# Patient Record
Sex: MALE | Race: WHITE | NOT HISPANIC OR LATINO | Employment: FULL TIME | URBAN - METROPOLITAN AREA
[De-identification: names, ages, dates, MRNs, and addresses within clinical notes are randomized per-mention and may not be internally consistent; named-entity substitution may affect disease eponyms.]

---

## 2017-01-08 ENCOUNTER — APPOINTMENT (OUTPATIENT)
Dept: LAB | Age: 57
End: 2017-01-08
Payer: COMMERCIAL

## 2017-01-08 DIAGNOSIS — Z12.5 ENCOUNTER FOR SCREENING FOR MALIGNANT NEOPLASM OF PROSTATE: ICD-10-CM

## 2017-01-08 DIAGNOSIS — Z00.00 ENCOUNTER FOR GENERAL ADULT MEDICAL EXAMINATION WITHOUT ABNORMAL FINDINGS: ICD-10-CM

## 2017-01-08 DIAGNOSIS — Z12.11 ENCOUNTER FOR SCREENING FOR MALIGNANT NEOPLASM OF COLON: ICD-10-CM

## 2017-01-08 DIAGNOSIS — E78.5 HYPERLIPIDEMIA: ICD-10-CM

## 2017-01-08 DIAGNOSIS — R73.02 IMPAIRED GLUCOSE TOLERANCE: ICD-10-CM

## 2017-01-08 LAB
ALBUMIN SERPL BCP-MCNC: 3.4 G/DL (ref 3.5–5)
ALP SERPL-CCNC: 79 U/L (ref 46–116)
ALT SERPL W P-5'-P-CCNC: 28 U/L (ref 12–78)
ANION GAP SERPL CALCULATED.3IONS-SCNC: 4 MMOL/L (ref 4–13)
AST SERPL W P-5'-P-CCNC: 17 U/L (ref 5–45)
BASOPHILS # BLD AUTO: 0.03 THOUSANDS/ΜL (ref 0–0.1)
BASOPHILS NFR BLD AUTO: 1 % (ref 0–1)
BILIRUB SERPL-MCNC: 0.58 MG/DL (ref 0.2–1)
BILIRUB UR QL STRIP: NEGATIVE
BUN SERPL-MCNC: 19 MG/DL (ref 5–25)
CALCIUM SERPL-MCNC: 8.7 MG/DL (ref 8.3–10.1)
CHLORIDE SERPL-SCNC: 106 MMOL/L (ref 100–108)
CHOLEST SERPL-MCNC: 196 MG/DL (ref 50–200)
CLARITY UR: CLEAR
CO2 SERPL-SCNC: 30 MMOL/L (ref 21–32)
COLOR UR: YELLOW
CREAT SERPL-MCNC: 1.07 MG/DL (ref 0.6–1.3)
EOSINOPHIL # BLD AUTO: 0.21 THOUSAND/ΜL (ref 0–0.61)
EOSINOPHIL NFR BLD AUTO: 4 % (ref 0–6)
ERYTHROCYTE [DISTWIDTH] IN BLOOD BY AUTOMATED COUNT: 12.1 % (ref 11.6–15.1)
GFR SERPL CREATININE-BSD FRML MDRD: >60 ML/MIN/1.73SQ M
GLUCOSE SERPL-MCNC: 99 MG/DL (ref 65–140)
GLUCOSE UR STRIP-MCNC: NEGATIVE MG/DL
HCT VFR BLD AUTO: 43.8 % (ref 36.5–49.3)
HDLC SERPL-MCNC: 36 MG/DL (ref 40–60)
HGB BLD-MCNC: 14.6 G/DL (ref 12–17)
HGB UR QL STRIP.AUTO: NEGATIVE
KETONES UR STRIP-MCNC: NEGATIVE MG/DL
LDLC SERPL CALC-MCNC: 139 MG/DL (ref 0–100)
LEUKOCYTE ESTERASE UR QL STRIP: NEGATIVE
LYMPHOCYTES # BLD AUTO: 1.22 THOUSANDS/ΜL (ref 0.6–4.47)
LYMPHOCYTES NFR BLD AUTO: 22 % (ref 14–44)
MCH RBC QN AUTO: 29.6 PG (ref 26.8–34.3)
MCHC RBC AUTO-ENTMCNC: 33.3 G/DL (ref 31.4–37.4)
MCV RBC AUTO: 89 FL (ref 82–98)
MONOCYTES # BLD AUTO: 0.62 THOUSAND/ΜL (ref 0.17–1.22)
MONOCYTES NFR BLD AUTO: 11 % (ref 4–12)
NEUTROPHILS # BLD AUTO: 3.42 THOUSANDS/ΜL (ref 1.85–7.62)
NEUTS SEG NFR BLD AUTO: 62 % (ref 43–75)
NITRITE UR QL STRIP: NEGATIVE
NRBC BLD AUTO-RTO: 0 /100 WBCS
PH UR STRIP.AUTO: 7 [PH] (ref 4.5–8)
PLATELET # BLD AUTO: 270 THOUSANDS/UL (ref 149–390)
PMV BLD AUTO: 9.7 FL (ref 8.9–12.7)
POTASSIUM SERPL-SCNC: 4.5 MMOL/L (ref 3.5–5.3)
PROT SERPL-MCNC: 6.7 G/DL (ref 6.4–8.2)
PROT UR STRIP-MCNC: NEGATIVE MG/DL
PSA SERPL-MCNC: 0.9 NG/ML (ref 0–4)
RBC # BLD AUTO: 4.94 MILLION/UL (ref 3.88–5.62)
SODIUM SERPL-SCNC: 140 MMOL/L (ref 136–145)
SP GR UR STRIP.AUTO: 1.02 (ref 1–1.03)
TRIGL SERPL-MCNC: 107 MG/DL
UROBILINOGEN UR QL STRIP.AUTO: 0.2 E.U./DL
WBC # BLD AUTO: 5.51 THOUSAND/UL (ref 4.31–10.16)

## 2017-01-08 PROCEDURE — 36415 COLL VENOUS BLD VENIPUNCTURE: CPT

## 2017-01-08 PROCEDURE — G0103 PSA SCREENING: HCPCS

## 2017-01-08 PROCEDURE — 80061 LIPID PANEL: CPT

## 2017-01-08 PROCEDURE — 80053 COMPREHEN METABOLIC PANEL: CPT

## 2017-01-08 PROCEDURE — 81003 URINALYSIS AUTO W/O SCOPE: CPT

## 2017-01-08 PROCEDURE — 85025 COMPLETE CBC W/AUTO DIFF WBC: CPT

## 2017-12-04 DIAGNOSIS — Z12.5 ENCOUNTER FOR SCREENING FOR MALIGNANT NEOPLASM OF PROSTATE: ICD-10-CM

## 2017-12-04 DIAGNOSIS — Z12.11 ENCOUNTER FOR SCREENING FOR MALIGNANT NEOPLASM OF COLON: ICD-10-CM

## 2017-12-04 DIAGNOSIS — E78.5 HYPERLIPIDEMIA: ICD-10-CM

## 2017-12-04 DIAGNOSIS — R73.02 IMPAIRED GLUCOSE TOLERANCE: ICD-10-CM

## 2017-12-10 ENCOUNTER — TRANSCRIBE ORDERS (OUTPATIENT)
Dept: ADMINISTRATIVE | Age: 57
End: 2017-12-10

## 2017-12-10 ENCOUNTER — APPOINTMENT (OUTPATIENT)
Dept: LAB | Age: 57
End: 2017-12-10
Payer: COMMERCIAL

## 2017-12-10 DIAGNOSIS — Z12.11 ENCOUNTER FOR SCREENING FOR MALIGNANT NEOPLASM OF COLON: ICD-10-CM

## 2017-12-10 DIAGNOSIS — E78.5 HYPERLIPIDEMIA: ICD-10-CM

## 2017-12-10 DIAGNOSIS — Z12.5 ENCOUNTER FOR SCREENING FOR MALIGNANT NEOPLASM OF PROSTATE: ICD-10-CM

## 2017-12-10 DIAGNOSIS — R73.02 IMPAIRED GLUCOSE TOLERANCE: ICD-10-CM

## 2017-12-10 LAB
ALBUMIN SERPL BCP-MCNC: 3.3 G/DL (ref 3.5–5)
ALP SERPL-CCNC: 80 U/L (ref 46–116)
ALT SERPL W P-5'-P-CCNC: 31 U/L (ref 12–78)
ANION GAP SERPL CALCULATED.3IONS-SCNC: 5 MMOL/L (ref 4–13)
AST SERPL W P-5'-P-CCNC: 21 U/L (ref 5–45)
BASOPHILS # BLD AUTO: 0.03 THOUSANDS/ΜL (ref 0–0.1)
BASOPHILS NFR BLD AUTO: 1 % (ref 0–1)
BILIRUB SERPL-MCNC: 0.29 MG/DL (ref 0.2–1)
BILIRUB UR QL STRIP: NEGATIVE
BUN SERPL-MCNC: 19 MG/DL (ref 5–25)
CALCIUM SERPL-MCNC: 8.7 MG/DL (ref 8.3–10.1)
CHLORIDE SERPL-SCNC: 107 MMOL/L (ref 100–108)
CLARITY UR: CLEAR
CO2 SERPL-SCNC: 29 MMOL/L (ref 21–32)
COLOR UR: YELLOW
CREAT SERPL-MCNC: 1.08 MG/DL (ref 0.6–1.3)
EOSINOPHIL # BLD AUTO: 0.2 THOUSAND/ΜL (ref 0–0.61)
EOSINOPHIL NFR BLD AUTO: 4 % (ref 0–6)
ERYTHROCYTE [DISTWIDTH] IN BLOOD BY AUTOMATED COUNT: 12.4 % (ref 11.6–15.1)
GFR SERPL CREATININE-BSD FRML MDRD: 76 ML/MIN/1.73SQ M
GLUCOSE P FAST SERPL-MCNC: 98 MG/DL (ref 65–99)
GLUCOSE UR STRIP-MCNC: NEGATIVE MG/DL
HCT VFR BLD AUTO: 41.5 % (ref 36.5–49.3)
HGB BLD-MCNC: 13.8 G/DL (ref 12–17)
HGB UR QL STRIP.AUTO: NEGATIVE
KETONES UR STRIP-MCNC: NEGATIVE MG/DL
LEUKOCYTE ESTERASE UR QL STRIP: NEGATIVE
LYMPHOCYTES # BLD AUTO: 1.41 THOUSANDS/ΜL (ref 0.6–4.47)
LYMPHOCYTES NFR BLD AUTO: 28 % (ref 14–44)
MCH RBC QN AUTO: 29.3 PG (ref 26.8–34.3)
MCHC RBC AUTO-ENTMCNC: 33.3 G/DL (ref 31.4–37.4)
MCV RBC AUTO: 88 FL (ref 82–98)
MONOCYTES # BLD AUTO: 0.61 THOUSAND/ΜL (ref 0.17–1.22)
MONOCYTES NFR BLD AUTO: 12 % (ref 4–12)
NEUTROPHILS # BLD AUTO: 2.79 THOUSANDS/ΜL (ref 1.85–7.62)
NEUTS SEG NFR BLD AUTO: 55 % (ref 43–75)
NITRITE UR QL STRIP: NEGATIVE
NRBC BLD AUTO-RTO: 0 /100 WBCS
PH UR STRIP.AUTO: 6 [PH] (ref 4.5–8)
PLATELET # BLD AUTO: 296 THOUSANDS/UL (ref 149–390)
PMV BLD AUTO: 9.6 FL (ref 8.9–12.7)
POTASSIUM SERPL-SCNC: 4.5 MMOL/L (ref 3.5–5.3)
PROT SERPL-MCNC: 6.8 G/DL (ref 6.4–8.2)
PROT UR STRIP-MCNC: NEGATIVE MG/DL
PSA SERPL-MCNC: 0.7 NG/ML (ref 0–4)
RBC # BLD AUTO: 4.71 MILLION/UL (ref 3.88–5.62)
SODIUM SERPL-SCNC: 141 MMOL/L (ref 136–145)
SP GR UR STRIP.AUTO: 1.02 (ref 1–1.03)
UROBILINOGEN UR QL STRIP.AUTO: 0.2 E.U./DL
WBC # BLD AUTO: 5.05 THOUSAND/UL (ref 4.31–10.16)

## 2017-12-10 PROCEDURE — 85025 COMPLETE CBC W/AUTO DIFF WBC: CPT

## 2017-12-10 PROCEDURE — G0103 PSA SCREENING: HCPCS

## 2017-12-10 PROCEDURE — 80053 COMPREHEN METABOLIC PANEL: CPT

## 2017-12-10 PROCEDURE — 36415 COLL VENOUS BLD VENIPUNCTURE: CPT

## 2017-12-10 PROCEDURE — 81003 URINALYSIS AUTO W/O SCOPE: CPT

## 2017-12-11 ENCOUNTER — ALLSCRIPTS OFFICE VISIT (OUTPATIENT)
Dept: OTHER | Facility: OTHER | Age: 57
End: 2017-12-11

## 2018-01-10 NOTE — PROGRESS NOTES
Assessment    1  Hyperlipemia (272 4) (E78 5)   2  Glucose intolerance (impaired glucose tolerance) (790 22) (R73 02)    Plan  Colon cancer screening, Encounter for prostate cancer screening    · (1) PSA (SCREEN) (Dx V76 44 Screen for Prostate Cancer); Status:Active; Requested  for:10Geq3330;   Colon cancer screening, Health Maintenance, Glucose intolerance (impaired glucose  tolerance), Hyperlipemia    · (1) OCCULT BLOOD, FECAL IMMUNOCHEMICAL TEST; Status:Active; Requested  for:11Xvw0740;   Encounter for prostate cancer screening    · (1) URINALYSIS (will reflex a microscopy if leukocytes, occult blood, protein or nitrites  are not within normal limits); Status:Active; Requested for:01Ync6091; Health Maintenance    · EKG/ECG- POC; Status:Active - Perform Order; Requested for:49Tts1833; Health Maintenance, Glucose intolerance (impaired glucose tolerance), Hyperlipemia    · (1) CBC/PLT/DIFF; Status:Active; Requested for:02Oev9141;    · (1) COMPREHENSIVE METABOLIC PANEL; Status:Active; Requested for:46Ikp6487;    · (1) LIPID PANEL FASTING W DIRECT LDL REFLEX; Status:Active; Requested  for:65Dus0369;     She'll obtain routine laboratory blood work to evaluate his PSA lipid profile CBC comprehensive metabolic profile and routine fit test     Discussion/Summary  Patient's examination today appears to be in good order no new findings on his exam  Pending at this time as his blood work we will review with the patient when it becomes available  I like to see him in 1 year for his next visit  Healthy lifestyle was reviewed with the patient including adequate exercise healthy diet and an adequate amount of sleep at night  He is due for his next colonoscopy in 2018  Chief Complaint  patient is here for a yearly physical       History of Present Illness  HM, Adult Male: The patient is being seen for a health maintenance evaluation  The last health maintenance visit was 1 year(s) ago     Social History: Household members include spouse  He is   Work status: working full time  The patient has never smoked cigarettes  He reports rare alcohol use  The patient has no concerns about alcohol abuse  He has never used illicit drugs  General Health: The patient's health since the last visit is described as good  He has regular dental visits  He denies vision problems  He denies hearing loss  Immunizations status: up to date  Lifestyle:  He consumes a diverse and healthy diet  He does not have any weight concerns  He exercises regularly  He does not use tobacco  He denies alcohol use  He denies drug use  Screening: cancer screening reviewed and current  metabolic screening reviewed and current  risk screening reviewed and current  HPI: This 29-year-old gentleman presents today for an annual health maintenance examination  He indicates no new problems or issues since last year's examination  He has a history of mild glucose intolerance and hyperlipidemia was some mild enlargement in his prostate gland which is been asymptomatic  Review of Systems    Constitutional: No fever or chills, feels well, no tiredness, no recent weight gain or weight loss  Eyes: No complaints of eye pain, no red eyes, no discharge from eyes, no itchy eyes  ENT: no complaints of earache, no hearing loss, no nosebleeds, no nasal discharge, no sore throat, no hoarseness  Cardiovascular: No complaints of slow heart rate, no fast heart rate, no chest pain, no palpitations, no leg claudication, no lower extremity  Respiratory: No complaints of shortness of breath, no wheezing, no cough, no SOB on exertion, no orthopnea or PND  Gastrointestinal: No complaints of abdominal pain, no constipation, no nausea or vomiting, no diarrhea or bloody stools  Genitourinary: No complaints of dysuria, no incontinence, no hesitancy, no nocturia, no genital lesion, no testicular pain     Musculoskeletal: No complaints of arthralgia, no myalgias, no joint swelling or stiffness, no limb pain or swelling  Integumentary: No complaints of skin rash or skin lesions, no itching, no skin wound, no dry skin  Neurological: No compliants of headache, no confusion, no convulsions, no numbness or tingling, no dizziness or fainting, no limb weakness, no difficulty walking  Psychiatric: Is not suicidal, no sleep disturbances, no anxiety or depression, no change in personality, no emotional problems  Endocrine: No complaints of proptosis, no hot flashes, no muscle weakness, no erectile dysfunction, no deepening of the voice, no feelings of weakness  Hematologic/Lymphatic: No complaints of swollen glands, no swollen glands in the neck, does not bleed easily, no easy bruising  Active Problems    1  Facial swelling (784 2) (R22 0)    Family History  Family History    · Family history of Heart disease (429 9) (I51 9)    Social History    · Never smoker    Current Meds   1  No Reported Medications Recorded    Allergies    1  Penicillins    Vitals   Recorded: 69TBA2549 11:13AM   Temperature 97 4 F   Heart Rate 75   Respiration 14   Systolic 524   Diastolic 70   Height 5 ft 6 in   Weight 192 lb    BMI Calculated 30 99   BSA Calculated 1 97   O2 Saturation 98     Physical Exam    Constitutional   General appearance: No acute distress, well appearing and well nourished  Head and Face   Head and face: Normal     Eyes   Conjunctiva and lids: No erythema, swelling or discharge  Pupils and irises: Equal, round, reactive to light  Ophthalmoscopic examination: Normal fundi and optic discs  Ears, Nose, Mouth, and Throat   External inspection of ears and nose: Normal     Otoscopic examination: Tympanic membranes translucent with normal light reflex  Canals patent without erythema  Nasal mucosa, septum, and turbinates: Normal without edema or erythema  Lips, teeth, and gums: Normal, good dentition      Oropharynx: Normal with no erythema, edema, exudate or lesions  Neck   Neck: Supple, symmetric, trachea midline, no masses  Thyroid: Normal, no thyromegaly  Pulmonary   Respiratory effort: No increased work of breathing or signs of respiratory distress  Percussion of chest: Normal     Cardiovascular   Auscultation of heart: Normal rate and rhythm, normal S1 and S2, no murmurs  Carotid pulses: 2+ bilaterally  Peripheral vascular exam: Normal     Examination of extremities for edema and/or varicosities: Normal     Chest   Breasts: Normal, no dimpling or skin changes appreciated  Abdomen   Abdomen: Non-tender, no masses  Liver and spleen: No hepatomegaly or splenomegaly  Examination for hernias: No hernias appreciated  Anus, perineum, and rectum: Normal sphincter tone, no masses, no prolapse  Genitourinary   Scrotal contents: Normal testes, no masses  Penis: Normal, no lesions  Digital rectal exam of prostate: Normal size, no masses  Lymphatic   Palpation of lymph nodes in neck: No lymphadenopathy  Palpation of lymph nodes in axillae: No lymphadenopathy  Palpation of lymph nodes in groin: No lymphadenopathy  Musculoskeletal   Gait and station: Normal     Inspection/palpation of digits and nails: Normal without clubbing or cyanosis  Inspection/palpation of joints, bones, and muscles: Normal     Range of motion: Normal     Stability: Normal     Muscle strength/tone: Normal     Skin   Skin and subcutaneous tissue: Normal without rashes or lesions  Palpation of skin and subcutaneous tissue: Normal turgor  Neurologic   Cranial nerves: Cranial nerves 2-12 intact  Cortical function: Normal mental status  Reflexes: 2+ and symmetric  Sensation: No sensory loss  Coordination: Normal finger to nose and heel to shin  Psychiatric   Judgment and insight: Normal     Orientation to person, place and time: Normal     Recent and remote memory: Intact      Mood and affect: Normal        Future Appointments    Date/Time Provider Specialty Site   12/11/2017 10:45 AM BAYRON Hernandez   Internal Medicine  [facility]     Signatures   Electronically signed by : BAYRON Cordova ; Dec  5 2016 12:29PM EST                       (Author)

## 2018-01-22 VITALS
HEIGHT: 66 IN | OXYGEN SATURATION: 97 % | DIASTOLIC BLOOD PRESSURE: 60 MMHG | BODY MASS INDEX: 30.38 KG/M2 | HEART RATE: 77 BPM | WEIGHT: 189.04 LBS | RESPIRATION RATE: 14 BRPM | SYSTOLIC BLOOD PRESSURE: 102 MMHG | TEMPERATURE: 97.8 F

## 2018-01-23 NOTE — PROGRESS NOTES
Assessment    1  Incomplete right bundle branch block (426 4) (I45 10)   2  Glucose intolerance (impaired glucose tolerance) (790 22) (R73 02)   3  Hyperlipemia (272 4) (E78 5)   4  Obesity (BMI 30-39 9) (278 00) (E66 9)    Plan  Health Maintenance    · EKG/ECG- POC; Status:Complete - Retrospective By Protocol Authorization;   Done:  58CMX3069 10:44AM      1  History of glucose intolerance the patient's glucose on this visit is 98 he is recommended to continue with a careful diet maintain healthy body mass and maintain regular exercise  Diet was reviewed with the patient  2   Asymptomatic incomplete right bundle branch block on EKG no symptoms at this time will continue to monitor  3   History of hyper lipidemia the patient's lipid profile is pending at this time will review it with him when it becomes available  4   Obesity the patient is weak sees the guideline recommendations for a normal body mass index regular exercise and calorie consumption reduction were reviewed with the patient  Discussion/Summary    In summary reviewed with the patient today his electrocardiogram blood work that is available as well as his complete examination  He appears to be in good health with no new problems since last year  Pending at the time of this dictation is his final lipid profile which will review with him by phone when it becomes available  He is encouraged to maintain a healthy lifestyle with regular exercise and healthy diet and maintain a healthy body mass index  He is presently in the obese category for his body mass index  Recommend the patient continue with annual examinations here and should have a colonoscopy every 10 years  total time of encounter was 45 minutes and 35 minutes was spent counseling  Chief Complaint  patient is here for a yearly physical       History of Present Illness  , Adult Male: The patient is being seen for a health maintenance evaluation   The last [facility] maintenance visit was 1 year(s) ago  Social History: Household members include spouse  He is   Work status: working full time  The patient has never smoked cigarettes  He reports rare alcohol use  The patient has no concerns about alcohol abuse  He has never used illicit drugs  General Health: The patient's health since the last visit is described as good  He has regular dental visits  He denies vision problems  He denies hearing loss  Immunizations status: up to date  Lifestyle:  He consumes a diverse and healthy diet  He does not have any weight concerns  He exercises regularly  He does not use tobacco  He denies alcohol use  He denies drug use  Screening:   HPI: This 67-year-old gentleman presents today for an annual health will wellness visit  He has no complaints  Review of Systems    Constitutional: No fever or chills, feels well, no tiredness, no recent weight gain or weight loss  Eyes: No complaints of eye pain, no red eyes, no discharge from eyes, no itchy eyes  ENT: no complaints of earache, no hearing loss, no nosebleeds, no nasal discharge, no sore throat, no hoarseness  Cardiovascular: No complaints of slow heart rate, no fast heart rate, no chest pain, no palpitations, no leg claudication, no lower extremity  Respiratory: No complaints of shortness of breath, no wheezing, no cough, no SOB on exertion, no orthopnea or PND  Gastrointestinal: No complaints of abdominal pain, no constipation, no nausea or vomiting, no diarrhea or bloody stools  Genitourinary: No complaints of dysuria, no incontinence, no hesitancy, no nocturia, no genital lesion, no testicular pain  Musculoskeletal: No complaints of arthralgia, no myalgias, no joint swelling or stiffness, no limb pain or swelling  Integumentary: No complaints of skin rash or skin lesions, no itching, no skin wound, no dry skin     Neurological: No compliants of headache, no confusion, no convulsions, no numbness or tingling, no dizziness or fainting, no limb weakness, no difficulty walking  Psychiatric: Is not suicidal, no sleep disturbances, no anxiety or depression, no change in personality, no emotional problems  Endocrine: No complaints of proptosis, no hot flashes, no muscle weakness, no erectile dysfunction, no deepening of the voice, no feelings of weakness  Hematologic/Lymphatic: No complaints of swollen glands, no swollen glands in the neck, does not bleed easily, no easy bruising  Active Problems    1  Acute sinusitis (461 9) (J01 90)   2  Colon cancer screening (V76 51) (Z12 11)   3  Encounter for prostate cancer screening (V76 44) (Z12 5)   4  Facial swelling (784 2) (R22 0)   5  Glucose intolerance (impaired glucose tolerance) (790 22) (R73 02)   6  Hyperlipemia (272 4) (E78 5)    Past Medical History    · History of hemorrhoids (V13 89) (Z87 19)   · History of hyperlipidemia (V12 29) (Z86 39)    Surgical History    · History of Incision Of Uvula    Family History  Father    · Family history of diabetes mellitus (V18 0) (Z83 3)   · Family history of myocardial infarction (V17 3) (Z82 49)  Grandparent    · Family history of diabetes mellitus (V18 0) (Z83 3)  Family History    · Family history of Heart disease (429 9) (I51 9)    Social History    · Never smoker   · Non drinker / no alcohol use    Current Meds   1  Zithromax Z-Chuy 250 MG Oral Tablet; TAKE 2 TABLETS ON DAY 1 THEN TAKE 1   TABLET A DAY FOR 4 DAYS; Therapy: 63SMI0691 to (Evaluate:18Oct2017)  Requested for: 22OOK3365; Last   Rx:13Oct2017 Ordered    Allergies    1  Penicillins    Vitals   Recorded: 18Ylw0842 10:41AM   Temperature 97 8 F   Heart Rate 77   Respiration 14   Systolic 174   Diastolic 60   Height 5 ft 6 in   Weight 189 lb 0 6 oz   BMI Calculated 30 51   BSA Calculated 1 95   O2 Saturation 97     Physical Exam    Constitutional   General appearance: No acute distress, well appearing and well nourished      Head and Face   Head and face: Normal     Eyes   Conjunctiva and lids: No erythema, swelling or discharge  Pupils and irises: Equal, round, reactive to light  Ophthalmoscopic examination: Normal fundi and optic discs  Ears, Nose, Mouth, and Throat   External inspection of ears and nose: Normal     Otoscopic examination: Tympanic membranes translucent with normal light reflex  Canals patent without erythema  Nasal mucosa, septum, and turbinates: Normal without edema or erythema  Lips, teeth, and gums: Normal, good dentition  Oropharynx: Normal with no erythema, edema, exudate or lesions  Neck   Neck: Supple, symmetric, trachea midline, no masses  Thyroid: Normal, no thyromegaly  Pulmonary   Percussion of chest: Normal     Auscultation of lungs: Clear to auscultation  Cardiovascular   Auscultation of heart: Normal rate and rhythm, normal S1 and S2, no murmurs  Carotid pulses: 2+ bilaterally  Abdominal aorta: Normal     Femoral pulses: 2+ bilaterally  Pedal pulses: 2+ bilaterally  Peripheral vascular exam: Normal     Examination of extremities for edema and/or varicosities: Normal     Abdomen   Abdomen: Non-tender, no masses  Liver and spleen: No hepatomegaly or splenomegaly  Genitourinary   Scrotal contents: Normal testes, no masses  Penis: Normal, no lesions  Digital rectal exam of prostate: Normal size, no masses  Lymphatic   Palpation of lymph nodes in neck: No lymphadenopathy  Palpation of lymph nodes in groin: No lymphadenopathy  Musculoskeletal   Gait and station: Normal     Inspection/palpation of digits and nails: Normal without clubbing or cyanosis  Inspection/palpation of joints, bones, and muscles: Normal     Range of motion: Normal     Stability: Normal     Muscle strength/tone: Normal     Skin   Skin and subcutaneous tissue: Normal without rashes or lesions  Palpation of skin and subcutaneous tissue: Normal turgor      Neurologic   Cranial nerves: Cranial nerves 2-12 intact  Cortical function: Normal mental status  Reflexes: 2+ and symmetric  Sensation: No sensory loss  Coordination: Normal finger to nose and heel to shin  Psychiatric   Judgment and insight: Normal     Orientation to person, place and time: Normal     Recent and remote memory: Intact  Mood and affect: Normal        Results/Data  PHQ-2 Adult Depression Screening 90Kqa4620 10:44AM User, Ahs     Test Name Result Flag Reference   PHQ-2 Adult Depression Score 0     Over the last two weeks, how often have you been bothered by any of the following problems? Little interest or pleasure in doing things: Not at all - 0  Feeling down, depressed, or hopeless: Not at all - 0   PHQ-2 Adult Depression Screening Negative       Falls Risk Assessment (Dx Z13 89 Screen for Neurologic Disorder) 74XRH2002 10:44AM User, Ahs     Test Name Result Flag Reference   Falls Risk      No falls in the past year     EKG/ECG- POC 52IYC5790 10:44AM Avis Mccoy     Test Name Result Flag Reference   EKG/ECG 12/11/2017         Future Appointments    Date/Time Provider Specialty Site   12/17/2018 10:45 AM BAYRON Leyva   Internal Medicine [facility] INTERNAL MED     Signatures   Electronically signed by : BAYRON Del Toro ; Dec 11 2017  7:49PM EST                       (Author)

## 2018-02-23 ENCOUNTER — APPOINTMENT (OUTPATIENT)
Dept: RADIOLOGY | Facility: OTHER | Age: 58
End: 2018-02-23
Payer: COMMERCIAL

## 2018-02-23 VITALS
HEART RATE: 78 BPM | SYSTOLIC BLOOD PRESSURE: 113 MMHG | DIASTOLIC BLOOD PRESSURE: 66 MMHG | WEIGHT: 186 LBS | HEIGHT: 66 IN | BODY MASS INDEX: 29.89 KG/M2

## 2018-02-23 DIAGNOSIS — M75.101 ROTATOR CUFF SYNDROME OF RIGHT SHOULDER: ICD-10-CM

## 2018-02-23 DIAGNOSIS — M75.31 CALCIFIC TENDINITIS OF RIGHT SHOULDER: ICD-10-CM

## 2018-02-23 DIAGNOSIS — M25.511 ACUTE PAIN OF RIGHT SHOULDER: Primary | ICD-10-CM

## 2018-02-23 PROCEDURE — 73030 X-RAY EXAM OF SHOULDER: CPT

## 2018-02-23 PROCEDURE — 99204 OFFICE O/P NEW MOD 45 MIN: CPT | Performed by: ORTHOPAEDIC SURGERY

## 2018-02-23 RX ORDER — MELOXICAM 7.5 MG/1
7.5 TABLET ORAL DAILY
Qty: 14 TABLET | Refills: 1 | Status: SHIPPED | OUTPATIENT
Start: 2018-02-23 | End: 2018-12-17 | Stop reason: ALTCHOICE

## 2018-02-23 NOTE — PATIENT INSTRUCTIONS
Calcific Tendinitis   WHAT YOU NEED TO KNOW:   What is calcific tendinitis? Calcific tendinitis is a condition that occurs when calcium collects in the tendons of the shoulder  Tendons are bands of tissue that connect muscle to bone  The calcium can make the tendons swell and cause severe pain  What increases my risk for calcific tendinitis? There is no known cause of calcific tendinitis  The following may increase your risk:  · Family history of calcific tendinitis    · Age between 27 and 48 years    · Female gender    · Lack of physical activity    · Medical conditions, such as diabetes  What are the signs and symptoms of calcific tendinitis? Signs and symptoms may be sudden and severe, lasting several weeks  Symptoms can also be mild and last several months  · Pain in your shoulder that may spread to your neck    · Trouble sleeping because of pain     · Stiffness or weakness in your arm or shoulder    · Decreased arm movement  How is calcific tendinitis diagnosed? Your healthcare provider will check how well you can move your shoulder and arm  He may check the function of your elbow, wrist, and hand  He may compare the movement and strength of your painful shoulder against your healthy shoulder  You may also need the following tests:  · An x-ray  may show calcium buildup in your shoulder  · An ultrasound  uses sound waves to show pictures on a monitor  An ultrasound may be done to show the cause of your pain  · An MRI  takes pictures of your shoulder  An MRI may show calcium in your shoulder or another cause of your pain  You may be given dye to help the parts of your shoulder show up better in the pictures  Tell the healthcare provider if you have ever had an allergic reaction to contrast dye  Do not enter the MRI room with anything metal  Metal can cause serious injury  Tell the healthcare provider if you have any metal in or on your body  How is calcific tendinitis treated?   Calcific tendinitis may go away on its own  The body absorbs the calcium, and the tendon heals  You may need any of the following:  · Medicines:      ¨ NSAIDs  may decrease swelling and pain  This medicine can be bought with or without a doctor's order  This medicine can cause stomach bleeding or kidney problems in certain people  If you take blood thinner medicine, always ask your healthcare provider if NSAIDs are safe for you  Always read the medicine label and follow the directions on it before using this medicine  ¨ Steroids  help decrease inflammation  You may be given steroids as a pill or a shot in your shoulder  · Needling  is a procedure used to break up the calcium or remove it  Your healthcare provider inserts one or more needles through your skin and into your shoulder  · Extracorporeal shockwave therapy (ESWT)  uses sound waves aimed at the calcium to help break it up  The pieces of calcium are then absorbed back into the body  ESWT may be done if symptoms last 3 months or longer  · Surgery  may be needed to remove the calcium if you have severe pain for several months and other treatments have not helped  Damaged tissue or bone may also be removed  How can I manage my symptoms? · Go to physical therapy  A physical therapist can teach you exercises to help improve movement and strength, and to decrease pain  · Apply ice  on your shoulder for 15 to 20 minutes every hour or as directed  Use an ice pack, or put crushed ice in a plastic bag  Cover it with a towel  Ice helps prevent tissue damage and decreases swelling and pain  · Apply heat  on your shoulder for 20 to 30 minutes every 2 hours for as many days as directed  Heat helps decrease pain and muscle spasms  · Rest your arm  Healthcare providers may have you place an item, such as a ball, between your side and elbow while you rest  This may help decrease stiffness and pain  When should I contact my healthcare provider?    · You have worse pain and stiffness in your shoulder  · You have new or more trouble moving your arm  · You have questions or concerns about your condition or care  When should I seek immediate care or call 911? · You cannot move your arm  · You have severe pain in your arm or shoulder  CARE AGREEMENT:   You have the right to help plan your care  Learn about your health condition and how it may be treated  Discuss treatment options with your caregivers to decide what care you want to receive  You always have the right to refuse treatment  The above information is an  only  It is not intended as medical advice for individual conditions or treatments  Talk to your doctor, nurse or pharmacist before following any medical regimen to see if it is safe and effective for you  © 2017 [employer]  Information is for End User's use only and may not be sold, redistributed or otherwise used for commercial purposes  All illustrations and images included in CareNotes® are the copyrighted property of [employer]  or Dakota Begum

## 2018-02-23 NOTE — PROGRESS NOTES
Assessment:       1  Acute pain of right shoulder        #2  Calcific tendinitis right shoulder/rotator cuff syndrome  Plan:        Explained my current clinical and radiological findings to Manny Mccollum today  His right shoulder pain is likely secondary to a mild rotator cuff strain/subacromial impingement syndrome along with calcific tendinitis  In this regard, I have advised him activity modification  I will also refer him for a course of physical therapy rehabilitation this regard  Will follow-up in about 2 months time and if he does have any significant persistent symptoms, we can consider doing a right subacromial cortisone injection  In the interim, he may take oral nonsteroidal anti-inflammatory medication on an as-needed basis for pain control  Subjective:     Patient ID: Wilfredo Arroyo is a 65 y o  male  Chief Complaint:    HPI  Shoulder Pain  Patient complaints of right shoulder pain  This is evaluated as a Non- injury  The pain is described as aching  The onset of the pain was gradual, starting about 2 months ago  The pain occurs Intermittently  Location is posterior, lateral  No history of dislocation  Symptoms are aggravated by reaching, repetitive useAnd sleeping on the affected side  Symptoms are diminished by  rest    Limited activities include: Mild limitation with overhead activities  No stiffness is reported  Patient is a  and he has not missed work  Social History     Occupational History    Not on file  Social History Main Topics    Smoking status: Never Smoker    Smokeless tobacco: Never Used    Alcohol use Yes      Comment: socially    Drug use: Unknown    Sexual activity: Not on file      Review of Systems   Constitutional: Negative  HENT: Negative  Eyes: Negative  Respiratory: Negative  Cardiovascular: Negative  Gastrointestinal: Negative  Endocrine: Negative  Genitourinary: Negative  Skin: Negative  Allergic/Immunologic: Negative  Neurological: Negative  Psychiatric/Behavioral: Negative  Objective:     Ortho ExamPhysical Exam   Constitutional: He is oriented to person, place, and time  He appears well-developed and well-nourished  HENT:   Head: Normocephalic and atraumatic  Eyes: Conjunctivae are normal    Cardiovascular: Normal rate and regular rhythm  Pulmonary/Chest: Effort normal  No respiratory distress  Neurological: He is alert and oriented to person, place, and time  Skin: Skin is warm  No erythema  Psychiatric: He has a normal mood and affect  His behavior is normal  Judgment and thought content normal        right Shoulder    Tenderness: None       Range of Motion:      Active Abduction: Normal     Passive Abduction: Normal     Forward Flexion: Normal     Extension: Normal     External Rotation: Normal     Internal Rotation 0 Deg: Normal     Internal Rotation 90 Deg: Normal       Muscle Strength:      Abduction: 4/5     Internal Rotation: 5/5     External Rotation: 4/5     Supraspinatus: 4/5     Subscapularis: 5/5     Biceps: 5/5       Tests:      Impingement: Positive     Leon Post: Positive     Cross Arm: Negative     Apprehension: Negative     Sulcus SIgn: Negative     Drop Arm Test: Negative       I have personally reviewed pertinent films in PACS and my interpretation is Plain radiograph of the right shoulder does not reveal any acute fracture or dislocation or significant degenerative changes  Small calcification seen Adjacent to the acromion indicative of likely calcific tendinitis

## 2018-03-02 ENCOUNTER — EVALUATION (OUTPATIENT)
Dept: PHYSICAL THERAPY | Age: 58
End: 2018-03-02
Payer: COMMERCIAL

## 2018-03-02 DIAGNOSIS — M75.101 ROTATOR CUFF SYNDROME OF RIGHT SHOULDER: ICD-10-CM

## 2018-03-02 DIAGNOSIS — M75.31 CALCIFIC TENDONITIS OF RIGHT SHOULDER: Primary | ICD-10-CM

## 2018-03-02 DIAGNOSIS — M75.31 CALCIFIC TENDINITIS OF RIGHT SHOULDER: ICD-10-CM

## 2018-03-02 PROCEDURE — G8990 OTHER PT/OT CURRENT STATUS: HCPCS | Performed by: PHYSICAL THERAPIST

## 2018-03-02 PROCEDURE — 97161 PT EVAL LOW COMPLEX 20 MIN: CPT | Performed by: PHYSICAL THERAPIST

## 2018-03-02 PROCEDURE — G8991 OTHER PT/OT GOAL STATUS: HCPCS | Performed by: PHYSICAL THERAPIST

## 2018-03-02 NOTE — PROGRESS NOTES
PT Evaluation     Today's date: 3/2/2018  Patient name: Abbe Baird  : 1960  MRN: [id number]  Referring provider: Azalia Irby MD  Dx:   Encounter Diagnosis     ICD-10-CM    1  Calcific tendonitis of right shoulder M75 31    2  Rotator cuff syndrome of right shoulder M75 101 Ambulatory referral to Physical Therapy                  Assessment  Impairments: impaired physical strength, lacks appropriate home exercise program and scapular dyskinesis  Functional limitations: None  Patient is not irritable  Assessment details: Patient is a 65 y o  male referred to PT by Dr Candy Carpenter with a dx of    Patient reports onset of pain complaints occurred 2 months ago for no apparent reason  He has had significant improvement since taking Meloxicam   Clinical examination consistent with scapular dyskinesis and RTC weakness  No other referral appears necessary at this time  Understanding of Dx/Px/POC: good   Prognosis: good    Goals  Short Term goals - 4 weeks  1  Patient will be independent and compliant with a HEP  2   Patient will report a 50% decrease in pain complaints  Long Term goals - 8 weeks  1  Patient will report elimination of pain complaints  2   Patient will return to all work related activities without restriction  3   Patient will return to all recreational activities without restriction  Plan  Patient would benefit from: skilled PT  Referral necessary: No  Planned therapy interventions: patient education, therapeutic exercise and home exercise program  Frequency: 1x week  Duration in visits: 3  Duration in weeks: 3  Treatment plan discussed with: patient        Subjective Evaluation    History of Present Illness  Date of onset: 2018  Mechanism of injury: Onset no apparent reason    Pain initially was constant, but has significantly improved since taking meloxicam     Not a recurrent problem   Quality of life: good    Pain  Current pain ratin  At best pain ratin  At worst pain rating: 3  Relieving factors: medications  Aggravating factors: nothing  Progression: improved    Social Support    Employment status: working ( - no unloading)  Hand dominance: right    Treatments  Current treatment: medication and physical therapy  Patient Goals  Patient goals for therapy: increased strength, return to sport/leisure activities, return to work and independence with ADLs/IADLs          Objective     Postural Observations  Seated posture: fair  Standing posture: good  Correction of posture: has no consistent effect        Cervical/Thoracic Screen   Cervical range of motion within normal limits  Cervical range of motion within normal limits with the following exceptions: No paint with overpressure in all planes    Active Range of Motion   Left Shoulder   Normal active range of motion    Right Shoulder   Normal active range of motion    Additional Active Range of Motion Details  Early R humeral elevation with shoulder elevation  Passive Range of Motion   Left Shoulder   Normal passive range of motion    Right Shoulder   Normal passive range of motion    Strength/Myotome Testing     Left Shoulder     Planes of Motion   Flexion: 5   Abduction: 5   External rotation at 0°: 5   Internal rotation at 0°: 5     Right Shoulder     Planes of Motion   Flexion: 5   Abduction: 4   External rotation at 0°: 4   Internal rotation at 0°: 5     Tests     Right Shoulder   Negative drop arm, external rotation lag sign and Hawkin's         Flowsheet Rows    Flowsheet Row Most Recent Value   PT/OT G-Codes   Current Score  80   Projected Score  80   FOTO information reviewed  Yes   Assessment Type  Evaluation   G code set  Other PT/OT Primary   Other PT Primary Current Status ()  CJ   Other PT Primary Goal Status ()  CJ          Precautions: None    Daily Treatment Diary     Manual                                                                                   Exercise Diary UBE             Sidelying ER             Sidelying Flex             Prone scaption             Prone hor abd             Wall push ups                                                                                                                                                                                                       Modalities

## 2018-03-16 ENCOUNTER — OFFICE VISIT (OUTPATIENT)
Dept: PHYSICAL THERAPY | Age: 58
End: 2018-03-16
Payer: COMMERCIAL

## 2018-03-16 DIAGNOSIS — M75.31 CALCIFIC TENDONITIS OF RIGHT SHOULDER: Primary | ICD-10-CM

## 2018-03-16 PROCEDURE — 97110 THERAPEUTIC EXERCISES: CPT

## 2018-03-16 NOTE — PROGRESS NOTES
Daily Note     Today's date: 3/16/2018  Patient name: Zeina Martínez  : 1960  MRN: [id number]  Referring provider: Rodolfo Mendez MD  Dx:   Encounter Diagnosis     ICD-10-CM    1  Calcific tendonitis of right shoulder M75 31                   Subjective:  Pt reports doing home ex given  Pt has no pain at current and has been doing full duty work      Objective: See treatment diary below  Daily Treatment Diary      Manual                                                                                                                                                      Exercise Diary   3/16                     UBE  8 min                     Sidelying ER  #3 3 x10                     Sidelying Flex, abd ER  3# 3 x10                     Prone scaption  #2 x30                     Prone hor abd & 45  #3 x30                     Wall push ups  30                      prone rows  # 3 x30                      supine serratus  #5 x 30                      standing shld flex and scaption  # 3 x 30                                                                                                                                                                                                                                                                                                   Modalities                                                                                                       Assessment: Reports no pain after ex performed  Handouts were issued for home ex       Plan: Cont PT x 1 more visit if pt remains pain free

## 2018-03-23 ENCOUNTER — OFFICE VISIT (OUTPATIENT)
Dept: PHYSICAL THERAPY | Age: 58
End: 2018-03-23
Payer: COMMERCIAL

## 2018-03-23 DIAGNOSIS — M75.31 CALCIFIC TENDONITIS OF RIGHT SHOULDER: Primary | ICD-10-CM

## 2018-03-23 PROCEDURE — 97110 THERAPEUTIC EXERCISES: CPT | Performed by: PHYSICAL THERAPIST

## 2018-03-23 NOTE — PROGRESS NOTES
D/C Summary    Today's date: 3/23/2018  Patient name: Nellie Eaton  : 1960  MRN: [id number]  Referring provider: Fabian Lenz MD  Dx:   Encounter Diagnosis     ICD-10-CM    1  Calcific tendonitis of right shoulder M75 31                   Subjective:  No pain complaints  Objective: See treatment diary below  Daily Treatment Diary      Manual                                                                                                                                                      Exercise Diary   3/16  3/23                   UBE  8 min 10                   Sidelying ER  #3 3 x10  3# 10x3                   Sidelying Flex, abd ER  3# 3 x10  3# 10x3                   Prone scaption  #2 x30  2# x30                   Prone hor abd & 45  #3 x30  3# 10x3                   Wall push ups  30  2x15                    prone rows  # 3 x30                      supine serratus  #5 x 30                      standing shld flex and scaption  # 3 x 30                                                                                                                                                                                                                                                                                                   Modalities                                                                                                       Assessment: All formal PT goals have been achieved  Plan:  D/C to HEP

## 2018-04-16 ENCOUNTER — OFFICE VISIT (OUTPATIENT)
Dept: OBGYN CLINIC | Facility: OTHER | Age: 58
End: 2018-04-16
Payer: COMMERCIAL

## 2018-04-16 VITALS
SYSTOLIC BLOOD PRESSURE: 98 MMHG | WEIGHT: 187 LBS | HEIGHT: 66 IN | BODY MASS INDEX: 30.05 KG/M2 | DIASTOLIC BLOOD PRESSURE: 61 MMHG | HEART RATE: 73 BPM

## 2018-04-16 DIAGNOSIS — M75.101 ROTATOR CUFF SYNDROME OF RIGHT SHOULDER: Primary | ICD-10-CM

## 2018-04-16 PROCEDURE — 99213 OFFICE O/P EST LOW 20 MIN: CPT | Performed by: ORTHOPAEDIC SURGERY

## 2018-04-16 NOTE — PROGRESS NOTES
Assessment:       1  Rotator cuff syndrome of right shoulder          Plan:        Explained my current clinical findings to Juliana Addison today  He has had good resolution of symptoms of his right shoulder pain with physical therapy rehabilitation  At this time I have advised him to continue with a home exercise program and he will be followed up in the office on an as-needed basis  Subjective:     Patient ID: Kathy Raymond is a 65 y o  male  Chief Complaint:    HPI  Juliana Addison is here today for a follow-up of his right shoulder pain  He was seen in this regard nearly 2 months ago and was referred for a course of physical therapy for right rotator cuff syndrome and possible calcific tendinitis  At this time, he denies any further pain of his right shoulder  He is able to do all activities from the right upper extremity without any discomfort at this time  Denies any distal tingling numbness or new interval development of symptoms in this regard since last office visit  Social History     Occupational History    Not on file  Social History Main Topics    Smoking status: Never Smoker    Smokeless tobacco: Never Used    Alcohol use Yes      Comment: socially    Drug use: Unknown    Sexual activity: Not on file      Review of Systems   Constitutional: Negative  HENT: Negative  Eyes: Negative  Respiratory: Negative  Cardiovascular: Negative  Gastrointestinal: Negative  Endocrine: Negative  Genitourinary: Negative  Skin: Negative  Allergic/Immunologic: Negative  Neurological: Negative  Psychiatric/Behavioral: Negative  Objective:     Ortho ExamPhysical Exam   Constitutional: He is oriented to person, place, and time  He appears well-developed and well-nourished  HENT:   Head: Normocephalic and atraumatic  Eyes: Conjunctivae are normal    Cardiovascular: Normal rate and regular rhythm  Pulmonary/Chest: Effort normal  No respiratory distress  Neurological: He is alert and oriented to person, place, and time  Skin: Skin is warm  No erythema  Psychiatric: He has a normal mood and affect   His behavior is normal  Judgment and thought content normal        right Shoulder    Tenderness: None       Range of Motion:      Active Abduction: Normal     Passive Abduction: Normal     Forward Flexion: Normal     Extension: Normal     External Rotation: Normal     Internal Rotation 0 Deg: Normal     Internal Rotation 90 Deg: Normal       Muscle Strength:      Abduction: 5/5     Internal Rotation: 5/5     External Rotation: 5/5     Supraspinatus: 5/5     Subscapularis: 5/5     Biceps: 5/5       Tests:      Impingement: Negative     Leon Post: Negative     Cross Arm: Negative     Apprehension: Negative     Sulcus SIgn: Negative     Drop Arm Test: Negative

## 2018-09-18 ENCOUNTER — TELEPHONE (OUTPATIENT)
Dept: INTERNAL MEDICINE CLINIC | Facility: CLINIC | Age: 58
End: 2018-09-18

## 2018-09-18 DIAGNOSIS — J01.90 ACUTE NON-RECURRENT SINUSITIS, UNSPECIFIED LOCATION: Primary | ICD-10-CM

## 2018-09-18 RX ORDER — AZITHROMYCIN 250 MG/1
TABLET, FILM COATED ORAL
Qty: 6 TABLET | Refills: 0 | Status: SHIPPED | OUTPATIENT
Start: 2018-09-18 | End: 2018-09-22

## 2018-09-18 NOTE — TELEPHONE ENCOUNTER
Please let the patient know that we sent a prescription in for Zithromax to his [facility] [address] in [place] thank you

## 2018-09-18 NOTE — TELEPHONE ENCOUNTER
Pt thinks he has a sinus infection,he has drainage and a sore throat,he would like something sent to [facility] in [place] can be reached at [phone]

## 2018-12-17 ENCOUNTER — OFFICE VISIT (OUTPATIENT)
Dept: INTERNAL MEDICINE CLINIC | Facility: CLINIC | Age: 58
End: 2018-12-17
Payer: COMMERCIAL

## 2018-12-17 VITALS
DIASTOLIC BLOOD PRESSURE: 60 MMHG | BODY MASS INDEX: 29.18 KG/M2 | HEART RATE: 75 BPM | OXYGEN SATURATION: 98 % | RESPIRATION RATE: 14 BRPM | WEIGHT: 181.6 LBS | TEMPERATURE: 97.5 F | SYSTOLIC BLOOD PRESSURE: 102 MMHG | HEIGHT: 66 IN

## 2018-12-17 DIAGNOSIS — E78.5 HYPERLIPIDEMIA, UNSPECIFIED HYPERLIPIDEMIA TYPE: ICD-10-CM

## 2018-12-17 DIAGNOSIS — N39.0 URINARY TRACT INFECTION WITHOUT HEMATURIA, SITE UNSPECIFIED: ICD-10-CM

## 2018-12-17 DIAGNOSIS — E66.3 OVERWEIGHT (BMI 25.0-29.9): ICD-10-CM

## 2018-12-17 DIAGNOSIS — R14.3 FLATULENCE: ICD-10-CM

## 2018-12-17 DIAGNOSIS — Z23 NEED FOR INFLUENZA VACCINATION: Primary | ICD-10-CM

## 2018-12-17 DIAGNOSIS — Z12.5 PROSTATE CANCER SCREENING: ICD-10-CM

## 2018-12-17 DIAGNOSIS — Z00.00 HEALTH MAINTENANCE EXAMINATION: ICD-10-CM

## 2018-12-17 DIAGNOSIS — D64.9 ANEMIA, UNSPECIFIED TYPE: ICD-10-CM

## 2018-12-17 PROBLEM — M75.31 CALCIFIC TENDINITIS OF RIGHT SHOULDER: Status: RESOLVED | Noted: 2018-02-23 | Resolved: 2018-12-17

## 2018-12-17 PROBLEM — M75.101 ROTATOR CUFF SYNDROME OF RIGHT SHOULDER: Status: RESOLVED | Noted: 2018-02-23 | Resolved: 2018-12-17

## 2018-12-17 PROCEDURE — 99396 PREV VISIT EST AGE 40-64: CPT | Performed by: INTERNAL MEDICINE

## 2018-12-17 NOTE — ASSESSMENT & PLAN NOTE
THIS PATIENT HAS A MILD ELEVATION IN HIS BODY MASS INDEX INDICATING AN OVERWEIGHT CONDITION WITH A READING OF 29 31 DIET AND EXERCISE REVIEWED THE BENEFITS OF WEIGHT REDUCTION WERE REVIEWED WITH THE PATIENT

## 2018-12-17 NOTE — PROGRESS NOTES
Assessment/Plan:    Overweight (BMI 25 0-29  9)  THIS PATIENT HAS A MILD ELEVATION IN HIS BODY MASS INDEX INDICATING AN OVERWEIGHT CONDITION WITH A READING OF 29 31 DIET AND EXERCISE REVIEWED THE BENEFITS OF WEIGHT REDUCTION WERE REVIEWED WITH THE PATIENT  Health maintenance examination  The patient presents today for an annual health maintenance examination  He has undergone a recent colonoscopy with no evidence of any polyps or diverticuli  He is on a 3 year follow-up visit with his gastroenterologist   He maintains a healthy lifestyle with regular exercise and tries to maintain healthy diet  His body mass index is slightly above normal and reviewed the benefits of weight reduction  Also reviewed strategies for weight reduction  Blood work was requested including lipid profile PSA screening for prostate cancer urinalysis and comprehensive metabolic profile  Will review the results of the studies with the patient after the testing has been completed  Diagnoses and all orders for this visit:    Need for influenza vaccination  -     Cancel: PREFERRED: influenza vaccine, [id number], quadrivalent, recombinant, PF, 0 5 mL, for patients 18 yr+ ([facility])    Anemia, unspecified type  -     CBC; Future    Flatulence  -     Comprehensive metabolic panel; Future    Prostate cancer screening  -     PSA, Total Screen; Future    Hyperlipidemia, unspecified hyperlipidemia type  -     Lipid panel; Future    Urinary tract infection without hematuria, site unspecified  -     UA w Reflex to Microscopic w Reflex to Culture; Future        Subjective:      Patient ID: Davi Anguiano is a 63 y o  male  THIS 64-YEAR-OLD GENTLEMAN PRESENTS TODAY FOR AN ANNUAL HEALTH MAINTENANCE EXAMINATION AND VISIT  HE HAS NO COMPLAINTS AT THIS TIME  HE CONTINUES TO MAINTAIN HEALTHY LIFESTYLE INCLUDING REGULAR EXERCISE AND HEALTHY DIET    WE DID REVIEW HIS BODY MASS INDEX WHICH IS SLIGHTLY ABOVE NORMAL WITH A READING OF 29 31 OF RECOMMENDED THAT HE CONTINUE TO WORK ON REDUCING TOTAL CALORIE CONSUMPTION  HE INDICATES THAT HIS WEIGHT NORMALLY GOES UP SLIGHTLY IN THE FALL AND WINTER SEASON AND DECREASE HIS THROUGH THE SPRING TIME  The following portions of the patient's history were reviewed and updated as appropriate:   He  has a past medical history of Hemorrhoids and Hyperlipidemia  He   Patient Active Problem List    Diagnosis Date Noted    Overweight (BMI 25 0-29 9) 12/17/2018    Health maintenance examination 12/17/2018     He  has a past surgical history that includes Nasal septum surgery (1994); Uvulectomy (1994); and Palate surgery (1994)  His family history includes Diabetes in his father and other; Heart attack in his father; Heart disease in his family; No Known Problems in his mother  He  reports that he has never smoked  He has never used smokeless tobacco  He reports that he drinks alcohol  His drug history is not on file  No current outpatient prescriptions on file  No current facility-administered medications for this visit       Review of Systems   All other systems reviewed and are negative  Objective:      /60   Pulse 75   Temp 97 5 °F (36 4 °C)   Resp 14   Ht 5' 6" (1 676 m)   Wt 82 4 kg (181 lb 9 6 oz)   SpO2 98%   BMI 29 31 kg/m²          Physical Exam   Constitutional: He is oriented to person, place, and time  He appears well-developed and well-nourished  No distress  HENT:   Head: Normocephalic and atraumatic  Right Ear: Hearing, tympanic membrane, external ear and ear canal normal    Left Ear: Hearing, tympanic membrane, external ear and ear canal normal    Nose: Nose normal    Mouth/Throat: Oropharynx is clear and moist and mucous membranes are normal  No oropharyngeal exudate  Eyes: Pupils are equal, round, and reactive to light  Conjunctivae are normal  Right eye exhibits no discharge  Left eye exhibits no discharge  Neck: No JVD present   No tracheal deviation present  No thyromegaly present  Cardiovascular: Normal rate, regular rhythm, S1 normal, S2 normal, normal heart sounds and intact distal pulses  Exam reveals no gallop and no friction rub  No murmur heard  Pulmonary/Chest: Effort normal and breath sounds normal  No respiratory distress  He has no wheezes  He has no rales  He exhibits no tenderness  Abdominal: Soft  Bowel sounds are normal  He exhibits no distension and no mass  There is no tenderness  There is no rebound and no guarding  Genitourinary: Rectum normal, prostate normal and penis normal    Musculoskeletal: Normal range of motion  He exhibits no edema  Lymphadenopathy:     He has no cervical adenopathy  Neurological: He is alert and oriented to person, place, and time  He has normal reflexes  He displays normal reflexes  No cranial nerve deficit  Coordination normal    Skin: Skin is warm and dry  No rash noted  He is not diaphoretic  No erythema  No pallor  Psychiatric: He has a normal mood and affect   His behavior is normal  Judgment and thought content normal

## 2018-12-17 NOTE — ASSESSMENT & PLAN NOTE
The patient presents today for an annual health maintenance examination  He has undergone a recent colonoscopy with no evidence of any polyps or diverticuli  He is on a 3 year follow-up visit with his gastroenterologist   He maintains a healthy lifestyle with regular exercise and tries to maintain healthy diet  His body mass index is slightly above normal and reviewed the benefits of weight reduction  Also reviewed strategies for weight reduction  Blood work was requested including lipid profile PSA screening for prostate cancer urinalysis and comprehensive metabolic profile  Will review the results of the studies with the patient after the testing has been completed

## 2019-02-10 ENCOUNTER — APPOINTMENT (OUTPATIENT)
Dept: LAB | Age: 59
End: 2019-02-10
Payer: COMMERCIAL

## 2019-02-10 DIAGNOSIS — E78.5 HYPERLIPIDEMIA, UNSPECIFIED HYPERLIPIDEMIA TYPE: ICD-10-CM

## 2019-02-10 DIAGNOSIS — N39.0 URINARY TRACT INFECTION WITHOUT HEMATURIA, SITE UNSPECIFIED: ICD-10-CM

## 2019-02-10 DIAGNOSIS — Z12.5 PROSTATE CANCER SCREENING: ICD-10-CM

## 2019-02-10 DIAGNOSIS — D64.9 ANEMIA, UNSPECIFIED TYPE: ICD-10-CM

## 2019-02-10 DIAGNOSIS — R14.3 FLATULENCE: ICD-10-CM

## 2019-02-10 LAB
ALBUMIN SERPL BCP-MCNC: 3.3 G/DL (ref 3.5–5)
ALP SERPL-CCNC: 148 U/L (ref 46–116)
ALT SERPL W P-5'-P-CCNC: 33 U/L (ref 12–78)
ANION GAP SERPL CALCULATED.3IONS-SCNC: 4 MMOL/L (ref 4–13)
AST SERPL W P-5'-P-CCNC: 29 U/L (ref 5–45)
BILIRUB SERPL-MCNC: 0.48 MG/DL (ref 0.2–1)
BILIRUB UR QL STRIP: NEGATIVE
BUN SERPL-MCNC: 15 MG/DL (ref 5–25)
CALCIUM SERPL-MCNC: 8.2 MG/DL (ref 8.3–10.1)
CHLORIDE SERPL-SCNC: 105 MMOL/L (ref 100–108)
CHOLEST SERPL-MCNC: 170 MG/DL (ref 50–200)
CLARITY UR: CLEAR
CO2 SERPL-SCNC: 28 MMOL/L (ref 21–32)
COLOR UR: YELLOW
CREAT SERPL-MCNC: 1 MG/DL (ref 0.6–1.3)
ERYTHROCYTE [DISTWIDTH] IN BLOOD BY AUTOMATED COUNT: 12.2 % (ref 11.6–15.1)
GFR SERPL CREATININE-BSD FRML MDRD: 83 ML/MIN/1.73SQ M
GLUCOSE P FAST SERPL-MCNC: 89 MG/DL (ref 65–99)
GLUCOSE UR STRIP-MCNC: NEGATIVE MG/DL
HCT VFR BLD AUTO: 41.2 % (ref 36.5–49.3)
HDLC SERPL-MCNC: 35 MG/DL (ref 40–60)
HGB BLD-MCNC: 13.1 G/DL (ref 12–17)
HGB UR QL STRIP.AUTO: NEGATIVE
KETONES UR STRIP-MCNC: NEGATIVE MG/DL
LDLC SERPL CALC-MCNC: 119 MG/DL (ref 0–100)
LEUKOCYTE ESTERASE UR QL STRIP: NEGATIVE
MCH RBC QN AUTO: 28.7 PG (ref 26.8–34.3)
MCHC RBC AUTO-ENTMCNC: 31.8 G/DL (ref 31.4–37.4)
MCV RBC AUTO: 90 FL (ref 82–98)
NITRITE UR QL STRIP: NEGATIVE
PH UR STRIP.AUTO: 7 [PH] (ref 4.5–8)
PLATELET # BLD AUTO: 382 THOUSANDS/UL (ref 149–390)
PMV BLD AUTO: 9.4 FL (ref 8.9–12.7)
POTASSIUM SERPL-SCNC: 4.4 MMOL/L (ref 3.5–5.3)
PROT SERPL-MCNC: 7.3 G/DL (ref 6.4–8.2)
PROT UR STRIP-MCNC: NEGATIVE MG/DL
PSA SERPL-MCNC: 1.4 NG/ML (ref 0–4)
RBC # BLD AUTO: 4.56 MILLION/UL (ref 3.88–5.62)
SODIUM SERPL-SCNC: 137 MMOL/L (ref 136–145)
SP GR UR STRIP.AUTO: 1.01 (ref 1–1.03)
TRIGL SERPL-MCNC: 81 MG/DL
UROBILINOGEN UR QL STRIP.AUTO: 0.2 E.U./DL
WBC # BLD AUTO: 5.94 THOUSAND/UL (ref 4.31–10.16)

## 2019-02-10 PROCEDURE — 80061 LIPID PANEL: CPT

## 2019-02-10 PROCEDURE — G0103 PSA SCREENING: HCPCS

## 2019-02-10 PROCEDURE — 85027 COMPLETE CBC AUTOMATED: CPT

## 2019-02-10 PROCEDURE — 36415 COLL VENOUS BLD VENIPUNCTURE: CPT

## 2019-02-10 PROCEDURE — 81003 URINALYSIS AUTO W/O SCOPE: CPT

## 2019-02-10 PROCEDURE — 80053 COMPREHEN METABOLIC PANEL: CPT

## 2019-03-25 ENCOUNTER — OFFICE VISIT (OUTPATIENT)
Dept: INTERNAL MEDICINE CLINIC | Facility: CLINIC | Age: 59
End: 2019-03-25
Payer: COMMERCIAL

## 2019-03-25 VITALS
BODY MASS INDEX: 29.38 KG/M2 | RESPIRATION RATE: 14 BRPM | TEMPERATURE: 98.4 F | DIASTOLIC BLOOD PRESSURE: 74 MMHG | SYSTOLIC BLOOD PRESSURE: 118 MMHG | HEART RATE: 83 BPM | OXYGEN SATURATION: 97 % | HEIGHT: 66 IN | WEIGHT: 182.8 LBS

## 2019-03-25 DIAGNOSIS — J02.9 PHARYNGITIS, UNSPECIFIED ETIOLOGY: Primary | ICD-10-CM

## 2019-03-25 DIAGNOSIS — J01.00 ACUTE NON-RECURRENT MAXILLARY SINUSITIS: ICD-10-CM

## 2019-03-25 PROBLEM — E66.9 OBESITY (BMI 30-39.9): Status: ACTIVE | Noted: 2017-12-11

## 2019-03-25 PROBLEM — I45.10 INCOMPLETE RIGHT BUNDLE BRANCH BLOCK: Status: ACTIVE | Noted: 2017-12-11

## 2019-03-25 PROCEDURE — 1036F TOBACCO NON-USER: CPT | Performed by: INTERNAL MEDICINE

## 2019-03-25 PROCEDURE — 3008F BODY MASS INDEX DOCD: CPT | Performed by: INTERNAL MEDICINE

## 2019-03-25 PROCEDURE — 99213 OFFICE O/P EST LOW 20 MIN: CPT | Performed by: INTERNAL MEDICINE

## 2019-03-25 RX ORDER — AZITHROMYCIN 250 MG/1
TABLET, FILM COATED ORAL
Qty: 6 TABLET | Refills: 0 | Status: SHIPPED | OUTPATIENT
Start: 2019-03-25 | End: 2019-03-29

## 2019-03-25 NOTE — PROGRESS NOTES
Assessment/Plan:    Pharyngitis  Findings of erythema of the posterior pharynx consistent with pharyngitis  Recommend the initiation of a 5 day Zithromax pack  In addition the patient may use a normal saline gargle 2 to 3 times a day along with Advil or Aleve as per the product insert for relief of sore throat symptoms  Acute non-recurrent maxillary sinusitis  Findings and symptoms consistent with an acute maxillary sinusitis with swelling and thick mucus in this sinuses bilaterally  Recommend initiation of Zithromax 5 day pack  Increased rest with increased hydration and good nutrition are recommended  Patient can continue on Mucinex for symptomatic relief  If symptoms worsen or fail to resolve with antibiotic therapies encouraged to return for reassessment  Diagnoses and all orders for this visit:    Pharyngitis, unspecified etiology  -     azithromycin (ZITHROMAX) 250 mg tablet; Take 2 tablets today then 1 tablet daily x 4 days    Acute non-recurrent maxillary sinusitis        Subjective:      Patient ID: Tita Coleman is a 63 y o  male  This 15-year-old gentleman presents today for an acute visit  He presents with 2-3 weeks of sinus infection symptoms with sinus pressure and drainage  He has also experienced a persistent sore throat  The mucus drainage from his sinuses has been yellow mucus  He denies any fevers or chills but is temperature is noted be 1 degree above normal reading for him today in the office  He has had an occasional cough which is nonproductive  He has been using Mucinex for symptomatic relief  The following portions of the patient's history were reviewed and updated as appropriate:   He  has a past medical history of Hemorrhoids and Hyperlipidemia  He   Patient Active Problem List    Diagnosis Date Noted    Pharyngitis 03/25/2019    Acute non-recurrent maxillary sinusitis 03/25/2019    Health maintenance examination 12/17/2018    Obesity (BMI 30-39  9) 12/11/2017    Incomplete right bundle branch block 12/11/2017    Hyperlipemia 12/05/2016    Glucose intolerance (impaired glucose tolerance) 12/05/2016     He  has a past surgical history that includes Nasal septum surgery (1994); Uvulectomy (1994); and Palate surgery (1994)  His family history includes Diabetes in his father and other; Heart attack in his father; Heart disease in his family; No Known Problems in his mother  He  reports that he has never smoked  He has never used smokeless tobacco  He reports that he drinks alcohol  His drug history is not on file  Current Outpatient Medications   Medication Sig Dispense Refill    azithromycin (ZITHROMAX) 250 mg tablet Take 2 tablets today then 1 tablet daily x 4 days 6 tablet 0     No current facility-administered medications for this visit       Review of Systems   HENT: Positive for congestion, postnasal drip, rhinorrhea, sinus pressure and sore throat  All other systems reviewed and are negative  Objective:      /74   Pulse 83   Temp 98 4 °F (36 9 °C)   Resp 14   Ht 5' 6" (1 676 m)   Wt 82 9 kg (182 lb 12 8 oz)   SpO2 97%   BMI 29 50 kg/m²          Physical Exam   Constitutional: He is oriented to person, place, and time  He appears well-developed and well-nourished  HENT:   Head: Normocephalic and atraumatic  Right Ear: Hearing, tympanic membrane, external ear and ear canal normal    Left Ear: Hearing, tympanic membrane, external ear and ear canal normal    Nose: Mucosal edema and rhinorrhea present  Mouth/Throat: Mucous membranes are normal  Posterior oropharyngeal erythema present  Eyes: Pupils are equal, round, and reactive to light  Conjunctivae are normal  Right eye exhibits no discharge  Left eye exhibits no discharge  Neck: No JVD present  No tracheal deviation present  No thyromegaly present  Cardiovascular: Normal rate, regular rhythm, S1 normal, S2 normal, normal heart sounds and intact distal pulses     No murmur heard  Pulmonary/Chest: Effort normal and breath sounds normal  No stridor  No respiratory distress  He has no wheezes  He has no rales  He exhibits no tenderness  Abdominal: He exhibits no distension  Musculoskeletal: Normal range of motion  He exhibits no edema  Lymphadenopathy:     He has no cervical adenopathy  Neurological: He is alert and oriented to person, place, and time  He has normal reflexes  He displays normal reflexes  Skin: Skin is warm and dry  Psychiatric: He has a normal mood and affect   His behavior is normal  Judgment and thought content normal

## 2019-03-25 NOTE — ASSESSMENT & PLAN NOTE
Findings of erythema of the posterior pharynx consistent with pharyngitis  Recommend the initiation of a 5 day Zithromax pack  In addition the patient may use a normal saline gargle 2 to 3 times a day along with Advil or Aleve as per the product insert for relief of sore throat symptoms

## 2019-03-25 NOTE — ASSESSMENT & PLAN NOTE
Findings and symptoms consistent with an acute maxillary sinusitis with swelling and thick mucus in this sinuses bilaterally  Recommend initiation of Zithromax 5 day pack  Increased rest with increased hydration and good nutrition are recommended  Patient can continue on Mucinex for symptomatic relief  If symptoms worsen or fail to resolve with antibiotic therapies encouraged to return for reassessment

## 2019-04-05 ENCOUNTER — TELEPHONE (OUTPATIENT)
Dept: INTERNAL MEDICINE CLINIC | Facility: CLINIC | Age: 59
End: 2019-04-05

## 2019-04-05 DIAGNOSIS — J02.9 PHARYNGITIS, UNSPECIFIED ETIOLOGY: Primary | ICD-10-CM

## 2019-04-05 RX ORDER — AZITHROMYCIN 250 MG/1
TABLET, FILM COATED ORAL
Qty: 6 TABLET | Refills: 0 | Status: SHIPPED | OUTPATIENT
Start: 2019-04-05 | End: 2019-04-09

## 2019-06-13 ENCOUNTER — OFFICE VISIT (OUTPATIENT)
Dept: INTERNAL MEDICINE CLINIC | Facility: CLINIC | Age: 59
End: 2019-06-13
Payer: COMMERCIAL

## 2019-06-13 VITALS
HEART RATE: 72 BPM | WEIGHT: 176 LBS | RESPIRATION RATE: 16 BRPM | BODY MASS INDEX: 28.28 KG/M2 | HEIGHT: 66 IN | DIASTOLIC BLOOD PRESSURE: 70 MMHG | OXYGEN SATURATION: 98 % | TEMPERATURE: 97.8 F | SYSTOLIC BLOOD PRESSURE: 122 MMHG

## 2019-06-13 DIAGNOSIS — R14.3 FLATULENCE: ICD-10-CM

## 2019-06-13 DIAGNOSIS — R10.84 GENERALIZED ABDOMINAL PAIN: Primary | ICD-10-CM

## 2019-06-13 PROCEDURE — 99213 OFFICE O/P EST LOW 20 MIN: CPT | Performed by: INTERNAL MEDICINE

## 2019-06-13 PROCEDURE — 3008F BODY MASS INDEX DOCD: CPT | Performed by: INTERNAL MEDICINE

## 2019-06-13 RX ORDER — SIMETHICONE 80 MG
80 TABLET,CHEWABLE ORAL
Qty: 40 TABLET | Refills: 1 | Status: SHIPPED | OUTPATIENT
Start: 2019-06-13 | End: 2020-10-07

## 2019-06-13 RX ORDER — MULTIVIT-MIN/IRON FUM/FOLIC AC 7.5 MG-4
1 TABLET ORAL DAILY
COMMUNITY

## 2019-06-13 RX ORDER — OMEPRAZOLE 20 MG/1
20 CAPSULE, DELAYED RELEASE ORAL
Qty: 14 CAPSULE | Refills: 1 | Status: SHIPPED | OUTPATIENT
Start: 2019-06-13 | End: 2020-10-07

## 2019-06-16 ENCOUNTER — APPOINTMENT (OUTPATIENT)
Dept: LAB | Age: 59
End: 2019-06-16
Payer: COMMERCIAL

## 2019-06-16 DIAGNOSIS — R10.84 GENERALIZED ABDOMINAL PAIN: ICD-10-CM

## 2019-06-16 LAB
ALBUMIN SERPL BCP-MCNC: 2.9 G/DL (ref 3.5–5)
ALP SERPL-CCNC: 155 U/L (ref 46–116)
ALT SERPL W P-5'-P-CCNC: 31 U/L (ref 12–78)
ANION GAP SERPL CALCULATED.3IONS-SCNC: 3 MMOL/L (ref 4–13)
AST SERPL W P-5'-P-CCNC: 19 U/L (ref 5–45)
BACTERIA UR QL AUTO: ABNORMAL /HPF
BASOPHILS # BLD AUTO: 0.06 THOUSANDS/ΜL (ref 0–0.1)
BASOPHILS NFR BLD AUTO: 1 % (ref 0–1)
BILIRUB SERPL-MCNC: 0.15 MG/DL (ref 0.2–1)
BILIRUB UR QL STRIP: NEGATIVE
BUN SERPL-MCNC: 16 MG/DL (ref 5–25)
CALCIUM SERPL-MCNC: 8.5 MG/DL (ref 8.3–10.1)
CHLORIDE SERPL-SCNC: 105 MMOL/L (ref 100–108)
CLARITY UR: CLEAR
CO2 SERPL-SCNC: 31 MMOL/L (ref 21–32)
COLOR UR: YELLOW
CREAT SERPL-MCNC: 1.09 MG/DL (ref 0.6–1.3)
EOSINOPHIL # BLD AUTO: 0.46 THOUSAND/ΜL (ref 0–0.61)
EOSINOPHIL NFR BLD AUTO: 7 % (ref 0–6)
ERYTHROCYTE [DISTWIDTH] IN BLOOD BY AUTOMATED COUNT: 12.2 % (ref 11.6–15.1)
GFR SERPL CREATININE-BSD FRML MDRD: 75 ML/MIN/1.73SQ M
GLUCOSE P FAST SERPL-MCNC: 91 MG/DL (ref 65–99)
GLUCOSE UR STRIP-MCNC: NEGATIVE MG/DL
HCT VFR BLD AUTO: 40 % (ref 36.5–49.3)
HGB BLD-MCNC: 12.5 G/DL (ref 12–17)
HGB UR QL STRIP.AUTO: NEGATIVE
HYALINE CASTS #/AREA URNS LPF: ABNORMAL /LPF
IMM GRANULOCYTES # BLD AUTO: 0.02 THOUSAND/UL (ref 0–0.2)
IMM GRANULOCYTES NFR BLD AUTO: 0 % (ref 0–2)
KETONES UR STRIP-MCNC: ABNORMAL MG/DL
LEUKOCYTE ESTERASE UR QL STRIP: NEGATIVE
LYMPHOCYTES # BLD AUTO: 1.43 THOUSANDS/ΜL (ref 0.6–4.47)
LYMPHOCYTES NFR BLD AUTO: 20 % (ref 14–44)
MCH RBC QN AUTO: 28 PG (ref 26.8–34.3)
MCHC RBC AUTO-ENTMCNC: 31.3 G/DL (ref 31.4–37.4)
MCV RBC AUTO: 90 FL (ref 82–98)
MONOCYTES # BLD AUTO: 0.7 THOUSAND/ΜL (ref 0.17–1.22)
MONOCYTES NFR BLD AUTO: 10 % (ref 4–12)
NEUTROPHILS # BLD AUTO: 4.45 THOUSANDS/ΜL (ref 1.85–7.62)
NEUTS SEG NFR BLD AUTO: 62 % (ref 43–75)
NITRITE UR QL STRIP: NEGATIVE
NON-SQ EPI CELLS URNS QL MICRO: ABNORMAL /HPF
NRBC BLD AUTO-RTO: 0 /100 WBCS
PH UR STRIP.AUTO: 7 [PH]
PLATELET # BLD AUTO: 449 THOUSANDS/UL (ref 149–390)
PMV BLD AUTO: 9.6 FL (ref 8.9–12.7)
POTASSIUM SERPL-SCNC: 4.3 MMOL/L (ref 3.5–5.3)
PROT SERPL-MCNC: 7 G/DL (ref 6.4–8.2)
PROT UR STRIP-MCNC: ABNORMAL MG/DL
RBC # BLD AUTO: 4.47 MILLION/UL (ref 3.88–5.62)
RBC #/AREA URNS AUTO: ABNORMAL /HPF
SODIUM SERPL-SCNC: 139 MMOL/L (ref 136–145)
SP GR UR STRIP.AUTO: 1.03 (ref 1–1.03)
UROBILINOGEN UR QL STRIP.AUTO: 0.2 E.U./DL
WBC # BLD AUTO: 7.12 THOUSAND/UL (ref 4.31–10.16)
WBC #/AREA URNS AUTO: ABNORMAL /HPF

## 2019-06-16 PROCEDURE — 36415 COLL VENOUS BLD VENIPUNCTURE: CPT

## 2019-06-16 PROCEDURE — 81001 URINALYSIS AUTO W/SCOPE: CPT | Performed by: INTERNAL MEDICINE

## 2019-06-16 PROCEDURE — 80053 COMPREHEN METABOLIC PANEL: CPT

## 2019-06-16 PROCEDURE — 85025 COMPLETE CBC W/AUTO DIFF WBC: CPT

## 2019-06-19 ENCOUNTER — TELEPHONE (OUTPATIENT)
Dept: INTERNAL MEDICINE CLINIC | Facility: CLINIC | Age: 59
End: 2019-06-19

## 2019-06-21 DIAGNOSIS — R19.7 DIARRHEA, UNSPECIFIED TYPE: ICD-10-CM

## 2019-06-21 DIAGNOSIS — R10.84 GENERALIZED ABDOMINAL PAIN: Primary | ICD-10-CM

## 2019-06-23 ENCOUNTER — HOSPITAL ENCOUNTER (OUTPATIENT)
Dept: ULTRASOUND IMAGING | Facility: HOSPITAL | Age: 59
Discharge: HOME | End: 2019-06-23
Attending: INTERNAL MEDICINE
Payer: COMMERCIAL

## 2019-06-23 DIAGNOSIS — R19.7 DIARRHEA, UNSPECIFIED TYPE: ICD-10-CM

## 2019-06-23 DIAGNOSIS — R10.84 GENERALIZED ABDOMINAL PAIN: ICD-10-CM

## 2019-06-23 PROCEDURE — 76700 US EXAM ABDOM COMPLETE: CPT

## 2019-06-24 ENCOUNTER — TELEPHONE (OUTPATIENT)
Dept: INTERNAL MEDICINE CLINIC | Facility: CLINIC | Age: 59
End: 2019-06-24

## 2019-11-22 ENCOUNTER — TELEPHONE (OUTPATIENT)
Dept: INTERNAL MEDICINE CLINIC | Facility: CLINIC | Age: 59
End: 2019-11-22

## 2019-11-22 DIAGNOSIS — J01.00 ACUTE NON-RECURRENT MAXILLARY SINUSITIS: Primary | ICD-10-CM

## 2019-11-22 RX ORDER — AZITHROMYCIN 250 MG/1
TABLET, FILM COATED ORAL
Qty: 6 TABLET | Refills: 0 | Status: SHIPPED | OUTPATIENT
Start: 2019-11-22 | End: 2019-11-27

## 2019-11-22 NOTE — TELEPHONE ENCOUNTER
Please call the patient let him know that I sent a prescription for Zithromax to his [facility] on [address]    If he has persistent symptoms he will need to come into the office for follow-up thank you

## 2019-11-22 NOTE — TELEPHONE ENCOUNTER
Patient called stating that he is experiencing symptoms of a sinus infection  Would like antibiotics  I told him to come in for a sick visit but he is unable to make it at the times we have  He said he will call Monday if he is not feeling any better

## 2019-12-30 ENCOUNTER — OFFICE VISIT (OUTPATIENT)
Dept: INTERNAL MEDICINE CLINIC | Facility: CLINIC | Age: 59
End: 2019-12-30
Payer: COMMERCIAL

## 2019-12-30 VITALS
SYSTOLIC BLOOD PRESSURE: 116 MMHG | HEART RATE: 87 BPM | RESPIRATION RATE: 14 BRPM | WEIGHT: 177.6 LBS | TEMPERATURE: 97.1 F | HEIGHT: 66 IN | BODY MASS INDEX: 28.54 KG/M2 | OXYGEN SATURATION: 97 % | DIASTOLIC BLOOD PRESSURE: 64 MMHG

## 2019-12-30 DIAGNOSIS — Z13.0 SCREENING FOR DEFICIENCY ANEMIA: ICD-10-CM

## 2019-12-30 DIAGNOSIS — N48.6 PEYRONIE DISEASE: ICD-10-CM

## 2019-12-30 DIAGNOSIS — E78.5 HYPERLIPIDEMIA, UNSPECIFIED HYPERLIPIDEMIA TYPE: Primary | ICD-10-CM

## 2019-12-30 DIAGNOSIS — E66.3 OVERWEIGHT (BMI 25.0-29.9): ICD-10-CM

## 2019-12-30 DIAGNOSIS — R73.02 GLUCOSE INTOLERANCE (IMPAIRED GLUCOSE TOLERANCE): ICD-10-CM

## 2019-12-30 DIAGNOSIS — Z13.1 SCREENING FOR DIABETES MELLITUS: ICD-10-CM

## 2019-12-30 DIAGNOSIS — Z12.5 PROSTATE CANCER SCREENING: ICD-10-CM

## 2019-12-30 DIAGNOSIS — R14.3 FLATULENCE: ICD-10-CM

## 2019-12-30 DIAGNOSIS — Z12.11 COLON CANCER SCREENING: ICD-10-CM

## 2019-12-30 DIAGNOSIS — Z00.00 HEALTH CARE MAINTENANCE: ICD-10-CM

## 2019-12-30 DIAGNOSIS — L84 CALLUS OF FOOT: ICD-10-CM

## 2019-12-30 DIAGNOSIS — E73.9 LACTOSE INTOLERANCE: ICD-10-CM

## 2019-12-30 PROBLEM — R10.84 GENERALIZED ABDOMINAL PAIN: Status: RESOLVED | Noted: 2019-06-13 | Resolved: 2019-12-30

## 2019-12-30 PROBLEM — J01.00 ACUTE NON-RECURRENT MAXILLARY SINUSITIS: Status: RESOLVED | Noted: 2019-03-25 | Resolved: 2019-12-30

## 2019-12-30 PROBLEM — J02.9 PHARYNGITIS: Status: RESOLVED | Noted: 2019-03-25 | Resolved: 2019-12-30

## 2019-12-30 PROCEDURE — 99396 PREV VISIT EST AGE 40-64: CPT | Performed by: INTERNAL MEDICINE

## 2019-12-31 NOTE — ASSESSMENT & PLAN NOTE
Mild flatulence issues which tend to be rectally based  Recommended to the patient that he minimize his consumption of foods noted to be highly gassy as well as eliminate lactose as much as possible from his diet

## 2019-12-31 NOTE — ASSESSMENT & PLAN NOTE
The patient confides today that he does have peyronies disease with a curvature of his penis when it is a errect  Will refer him on to Urology services at [facility] for further evaluation and discussion of treatment options with the patient

## 2019-12-31 NOTE — ASSESSMENT & PLAN NOTE
Patient has mild lactose intolerance he is found that reducing lactose oral avoiding lactose eliminates a lot of bowel irritability and flatulence  Presently he is using lactose free dairy products  Recommend continuation of this approach

## 2019-12-31 NOTE — PROGRESS NOTES
Assessment/Plan:    Glucose intolerance (impaired glucose tolerance)  Patient does have a remote history of mild glucose impairment the we provided him with a request for a comprehensive metabolic profile to evaluate his fasting blood sugar  He has no symptoms at this time to indicate uncontrolled blood sugars either high or low  Will review the results of his studies when they are completed  Peyronie disease  The patient confides today that he does have peyronies disease with a curvature of his penis when it is a errect  Will refer him on to Urology services at [facility] for further evaluation and discussion of treatment options with the patient  Overweight (BMI 25 0-29  9)  Patient has a mild overweight condition with a body mass index of 28 67  I reviewed this with the patient today recommending that he decrease his calorie consumption in an effort to reduce his weight further  It is recommended that he obtain regular exercise 3 to 4 times a week and also decreased carbohydrate consumption in an effort to reduce his body mass index  Lactose intolerance  Patient has mild lactose intolerance he is found that reducing lactose oral avoiding lactose eliminates a lot of bowel irritability and flatulence  Presently he is using lactose free dairy products  Recommend continuation of this approach  Hyperlipemia  Prior history of mild hyperlipidemia is noted  A request for a lipid profile was provided during today's visit and we will review the patient's lipids when they are completed  Flatulence  Mild flatulence issues which tend to be rectally based  Recommended to the patient that he minimize his consumption of foods noted to be highly gassy as well as eliminate lactose as much as possible from his diet  Callus of foot  Callus on the lateral aspect of the 5th toe likely either due to constant irritation from his shoe or possible viral infection    A referral to podiatry was provided to the patient today for further treatment of this callus  Diagnoses and all orders for this visit:    Hyperlipidemia, unspecified hyperlipidemia type  -     Lipid panel; Future    Prostate cancer screening  -     PSA, Total Screen; Future    Colon cancer screening  -     Occult Blood, Fecal Immunochemical; Future    Screening for deficiency anemia  -     CBC and differential; Future    Screening for diabetes mellitus  -     Comprehensive metabolic panel; Future    Lactose intolerance    Flatulence    Peyronie disease  -     Ambulatory referral to Urology; Future    Callus of foot        Subjective:      Patient ID: Craig Haas is a 63 y o  male  This is an annual physical examination of and review of blood work for this 55-year-old gentleman  He has no active complaints on today's visit  He does have a history of psoriasis of his skin  He also has a history of mild glucose elevations in the past       The following portions of the patient's history were reviewed and updated as appropriate:   He  has a past medical history of Hemorrhoids and Hyperlipidemia  He   Patient Active Problem List    Diagnosis Date Noted    Lactose intolerance 12/30/2019    Peyronie disease 12/30/2019    Callus of foot 12/30/2019    Flatulence 06/13/2019    Health maintenance examination 12/17/2018    Overweight (BMI 25 0-29 9) 12/11/2017    Incomplete right bundle branch block 12/11/2017    Hyperlipemia 12/05/2016    Glucose intolerance (impaired glucose tolerance) 12/05/2016     He  has a past surgical history that includes Nasal septum surgery (1994); Uvulectomy (1994); and Palate surgery (1994)       Review of Systems   Constitutional: Negative for activity change, appetite change, chills, fatigue, fever and unexpected weight change  HENT: Negative for hearing loss  Eyes: Negative for visual disturbance  Respiratory: Negative for cough, chest tightness and shortness of breath      Cardiovascular: Negative for chest pain, palpitations and leg swelling  Gastrointestinal: Negative for constipation, diarrhea, nausea and vomiting  Genitourinary: Negative for dysuria and frequency  Musculoskeletal: Negative for arthralgias and myalgias  Allergic/Immunologic: Negative for environmental allergies  Neurological: Negative for dizziness, weakness, numbness and headaches  Psychiatric/Behavioral: Negative for sleep disturbance  The patient is not nervous/anxious  Objective:      /64   Pulse 87   Temp (!) 97 1 °F (36 2 °C)   Resp 14   Ht 5' 6" (1 676 m)   Wt 80 6 kg (177 lb 9 6 oz)   SpO2 97%   BMI 28 67 kg/m²          Physical Exam   Constitutional: He is oriented to person, place, and time  He appears well-developed and well-nourished  No distress  HENT:   Right Ear: Hearing, tympanic membrane, external ear and ear canal normal    Left Ear: Hearing, tympanic membrane, external ear and ear canal normal    Nose: Nose normal    Mouth/Throat: Oropharynx is clear and moist and mucous membranes are normal  No oropharyngeal exudate  Eyes: Pupils are equal, round, and reactive to light  Conjunctivae are normal  Right eye exhibits no discharge  Left eye exhibits no discharge  Neck: No thyromegaly present  Cardiovascular: Normal rate, regular rhythm, S1 normal, S2 normal, normal heart sounds and intact distal pulses  No murmur heard  Pulmonary/Chest: Effort normal and breath sounds normal  No stridor  No respiratory distress  He has no wheezes  He has no rales  Abdominal: Soft  Bowel sounds are normal  He exhibits no distension and no mass  There is no tenderness  There is no guarding  Hernia confirmed negative in the right inguinal area and confirmed negative in the left inguinal area  Genitourinary: Rectum normal, prostate normal, testes normal and penis normal    Musculoskeletal: Normal range of motion  He exhibits no edema, tenderness or deformity     Lymphadenopathy:     He has no cervical adenopathy  No inguinal adenopathy noted on the right side  Neurological: He is alert and oriented to person, place, and time  He has normal reflexes  No cranial nerve deficit  Skin: Skin is warm and dry  Rash noted  He is not diaphoretic  Psoriasis rash of the hands bilaterally   Psychiatric: He has a normal mood and affect  His behavior is normal  Judgment and thought content normal      BMI Counseling: Body mass index is 28 67 kg/m²  The BMI is above normal  Nutrition recommendations include reducing portion sizes

## 2019-12-31 NOTE — ASSESSMENT & PLAN NOTE
Patient does have a remote history of mild glucose impairment the we provided him with a request for a comprehensive metabolic profile to evaluate his fasting blood sugar  He has no symptoms at this time to indicate uncontrolled blood sugars either high or low  Will review the results of his studies when they are completed

## 2019-12-31 NOTE — ASSESSMENT & PLAN NOTE
Prior history of mild hyperlipidemia is noted  A request for a lipid profile was provided during today's visit and we will review the patient's lipids when they are completed

## 2019-12-31 NOTE — ASSESSMENT & PLAN NOTE
Patient has a mild overweight condition with a body mass index of 28 67  I reviewed this with the patient today recommending that he decrease his calorie consumption in an effort to reduce his weight further  It is recommended that he obtain regular exercise 3 to 4 times a week and also decreased carbohydrate consumption in an effort to reduce his body mass index

## 2019-12-31 NOTE — ASSESSMENT & PLAN NOTE
Callus on the lateral aspect of the 5th toe likely either due to constant irritation from his shoe or possible viral infection  A referral to podiatry was provided to the patient today for further treatment of this callus

## 2020-01-05 ENCOUNTER — APPOINTMENT (OUTPATIENT)
Dept: LAB | Age: 60
End: 2020-01-05
Payer: COMMERCIAL

## 2020-01-05 DIAGNOSIS — Z12.5 PROSTATE CANCER SCREENING: ICD-10-CM

## 2020-01-05 DIAGNOSIS — Z13.1 SCREENING FOR DIABETES MELLITUS: ICD-10-CM

## 2020-01-05 DIAGNOSIS — Z12.11 COLON CANCER SCREENING: ICD-10-CM

## 2020-01-05 DIAGNOSIS — E78.5 HYPERLIPIDEMIA, UNSPECIFIED HYPERLIPIDEMIA TYPE: ICD-10-CM

## 2020-01-05 DIAGNOSIS — Z13.0 SCREENING FOR DEFICIENCY ANEMIA: ICD-10-CM

## 2020-01-05 LAB
ALBUMIN SERPL BCP-MCNC: 3 G/DL (ref 3.5–5)
ALP SERPL-CCNC: 177 U/L (ref 46–116)
ALT SERPL W P-5'-P-CCNC: 52 U/L (ref 12–78)
ANION GAP SERPL CALCULATED.3IONS-SCNC: 1 MMOL/L (ref 4–13)
AST SERPL W P-5'-P-CCNC: 36 U/L (ref 5–45)
BASOPHILS # BLD AUTO: 0.07 THOUSANDS/ΜL (ref 0–0.1)
BASOPHILS NFR BLD AUTO: 1 % (ref 0–1)
BILIRUB SERPL-MCNC: 0.46 MG/DL (ref 0.2–1)
BUN SERPL-MCNC: 21 MG/DL (ref 5–25)
CALCIUM SERPL-MCNC: 8.7 MG/DL (ref 8.3–10.1)
CHLORIDE SERPL-SCNC: 108 MMOL/L (ref 100–108)
CHOLEST SERPL-MCNC: 179 MG/DL (ref 50–200)
CO2 SERPL-SCNC: 30 MMOL/L (ref 21–32)
CREAT SERPL-MCNC: 1.11 MG/DL (ref 0.6–1.3)
EOSINOPHIL # BLD AUTO: 0.32 THOUSAND/ΜL (ref 0–0.61)
EOSINOPHIL NFR BLD AUTO: 5 % (ref 0–6)
ERYTHROCYTE [DISTWIDTH] IN BLOOD BY AUTOMATED COUNT: 13.9 % (ref 11.6–15.1)
GFR SERPL CREATININE-BSD FRML MDRD: 73 ML/MIN/1.73SQ M
GLUCOSE P FAST SERPL-MCNC: 92 MG/DL (ref 65–99)
HCT VFR BLD AUTO: 40.2 % (ref 36.5–49.3)
HDLC SERPL-MCNC: 38 MG/DL
HGB BLD-MCNC: 12.5 G/DL (ref 12–17)
IMM GRANULOCYTES # BLD AUTO: 0.01 THOUSAND/UL (ref 0–0.2)
IMM GRANULOCYTES NFR BLD AUTO: 0 % (ref 0–2)
LDLC SERPL CALC-MCNC: 123 MG/DL (ref 0–100)
LYMPHOCYTES # BLD AUTO: 1.25 THOUSANDS/ΜL (ref 0.6–4.47)
LYMPHOCYTES NFR BLD AUTO: 19 % (ref 14–44)
MCH RBC QN AUTO: 26.6 PG (ref 26.8–34.3)
MCHC RBC AUTO-ENTMCNC: 31.1 G/DL (ref 31.4–37.4)
MCV RBC AUTO: 86 FL (ref 82–98)
MONOCYTES # BLD AUTO: 0.65 THOUSAND/ΜL (ref 0.17–1.22)
MONOCYTES NFR BLD AUTO: 10 % (ref 4–12)
NEUTROPHILS # BLD AUTO: 4.47 THOUSANDS/ΜL (ref 1.85–7.62)
NEUTS SEG NFR BLD AUTO: 65 % (ref 43–75)
NONHDLC SERPL-MCNC: 141 MG/DL
NRBC BLD AUTO-RTO: 0 /100 WBCS
PLATELET # BLD AUTO: 436 THOUSANDS/UL (ref 149–390)
PMV BLD AUTO: 9 FL (ref 8.9–12.7)
POTASSIUM SERPL-SCNC: 4.5 MMOL/L (ref 3.5–5.3)
PROT SERPL-MCNC: 7.6 G/DL (ref 6.4–8.2)
PSA SERPL-MCNC: 1.2 NG/ML (ref 0–4)
RBC # BLD AUTO: 4.7 MILLION/UL (ref 3.88–5.62)
SODIUM SERPL-SCNC: 139 MMOL/L (ref 136–145)
TRIGL SERPL-MCNC: 91 MG/DL
WBC # BLD AUTO: 6.77 THOUSAND/UL (ref 4.31–10.16)

## 2020-01-05 PROCEDURE — 36415 COLL VENOUS BLD VENIPUNCTURE: CPT

## 2020-01-05 PROCEDURE — 80061 LIPID PANEL: CPT

## 2020-01-05 PROCEDURE — G0103 PSA SCREENING: HCPCS

## 2020-01-05 PROCEDURE — 85025 COMPLETE CBC W/AUTO DIFF WBC: CPT

## 2020-01-05 PROCEDURE — 80053 COMPREHEN METABOLIC PANEL: CPT

## 2020-04-04 ENCOUNTER — HOSPITAL ENCOUNTER (EMERGENCY)
Facility: HOSPITAL | Age: 60
Discharge: HOME | End: 2020-04-04
Attending: EMERGENCY MEDICINE | Admitting: EMERGENCY MEDICINE
Payer: COMMERCIAL

## 2020-04-04 VITALS
SYSTOLIC BLOOD PRESSURE: 140 MMHG | HEART RATE: 87 BPM | BODY MASS INDEX: 27.32 KG/M2 | DIASTOLIC BLOOD PRESSURE: 72 MMHG | HEIGHT: 66 IN | TEMPERATURE: 99.4 F | OXYGEN SATURATION: 97 % | WEIGHT: 170 LBS | RESPIRATION RATE: 16 BRPM

## 2020-04-04 DIAGNOSIS — R21 RASH AND NONSPECIFIC SKIN ERUPTION: Primary | ICD-10-CM

## 2020-04-04 LAB
ANION GAP SERPL CALCULATED.3IONS-SCNC: 7 MMOL/L (ref 4–13)
BASOPHILS # BLD AUTO: 0.02 THOUSANDS/ΜL (ref 0–0.1)
BASOPHILS NFR BLD AUTO: 0 % (ref 0–1)
BUN SERPL-MCNC: 17 MG/DL (ref 5–25)
CALCIUM SERPL-MCNC: 8.3 MG/DL (ref 8.3–10.1)
CHLORIDE SERPL-SCNC: 102 MMOL/L (ref 100–108)
CO2 SERPL-SCNC: 29 MMOL/L (ref 21–32)
CREAT SERPL-MCNC: 1.21 MG/DL (ref 0.6–1.3)
EOSINOPHIL # BLD AUTO: 0.27 THOUSAND/ΜL (ref 0–0.61)
EOSINOPHIL NFR BLD AUTO: 5 % (ref 0–6)
ERYTHROCYTE [DISTWIDTH] IN BLOOD BY AUTOMATED COUNT: 13.8 % (ref 11.6–15.1)
GFR SERPL CREATININE-BSD FRML MDRD: 66 ML/MIN/1.73SQ M
GLUCOSE SERPL-MCNC: 157 MG/DL (ref 65–140)
HCT VFR BLD AUTO: 38.7 % (ref 36.5–49.3)
HGB BLD-MCNC: 12.2 G/DL (ref 12–17)
IMM GRANULOCYTES # BLD AUTO: 0.03 THOUSAND/UL (ref 0–0.2)
IMM GRANULOCYTES NFR BLD AUTO: 1 % (ref 0–2)
LYMPHOCYTES # BLD AUTO: 1.46 THOUSANDS/ΜL (ref 0.6–4.47)
LYMPHOCYTES NFR BLD AUTO: 24 % (ref 14–44)
MCH RBC QN AUTO: 26.9 PG (ref 26.8–34.3)
MCHC RBC AUTO-ENTMCNC: 31.5 G/DL (ref 31.4–37.4)
MCV RBC AUTO: 85 FL (ref 82–98)
MONOCYTES # BLD AUTO: 0.59 THOUSAND/ΜL (ref 0.17–1.22)
MONOCYTES NFR BLD AUTO: 10 % (ref 4–12)
NEUTROPHILS # BLD AUTO: 3.63 THOUSANDS/ΜL (ref 1.85–7.62)
NEUTS SEG NFR BLD AUTO: 60 % (ref 43–75)
NRBC BLD AUTO-RTO: 0 /100 WBCS
PLATELET # BLD AUTO: 443 THOUSANDS/UL (ref 149–390)
PMV BLD AUTO: 8.8 FL (ref 8.9–12.7)
POTASSIUM SERPL-SCNC: 3.8 MMOL/L (ref 3.5–5.3)
RBC # BLD AUTO: 4.53 MILLION/UL (ref 3.88–5.62)
SODIUM SERPL-SCNC: 138 MMOL/L (ref 136–145)
WBC # BLD AUTO: 6 THOUSAND/UL (ref 4.31–10.16)

## 2020-04-04 PROCEDURE — 99283 EMERGENCY DEPT VISIT LOW MDM: CPT

## 2020-04-04 PROCEDURE — 99285 EMERGENCY DEPT VISIT HI MDM: CPT | Performed by: PHYSICIAN ASSISTANT

## 2020-04-04 PROCEDURE — 36415 COLL VENOUS BLD VENIPUNCTURE: CPT | Performed by: PHYSICIAN ASSISTANT

## 2020-04-04 PROCEDURE — 85025 COMPLETE CBC W/AUTO DIFF WBC: CPT | Performed by: PHYSICIAN ASSISTANT

## 2020-04-04 PROCEDURE — 80048 BASIC METABOLIC PNL TOTAL CA: CPT | Performed by: PHYSICIAN ASSISTANT

## 2020-04-04 RX ORDER — CLOTRIMAZOLE 1 %
CREAM (GRAM) TOPICAL
Qty: 15 G | Refills: 0 | Status: SHIPPED | OUTPATIENT
Start: 2020-04-04 | End: 2020-10-07

## 2020-04-04 RX ORDER — FLUCONAZOLE 100 MG/1
200 TABLET ORAL ONCE
Status: COMPLETED | OUTPATIENT
Start: 2020-04-04 | End: 2020-04-04

## 2020-04-04 RX ADMIN — FLUCONAZOLE 200 MG: 100 TABLET ORAL at 22:55

## 2020-04-06 ENCOUNTER — VBI (OUTPATIENT)
Dept: ADMINISTRATIVE | Facility: OTHER | Age: 60
End: 2020-04-06

## 2020-06-29 ENCOUNTER — OFFICE VISIT (OUTPATIENT)
Dept: INTERNAL MEDICINE CLINIC | Facility: CLINIC | Age: 60
End: 2020-06-29
Payer: COMMERCIAL

## 2020-06-29 VITALS
RESPIRATION RATE: 16 BRPM | HEART RATE: 81 BPM | DIASTOLIC BLOOD PRESSURE: 72 MMHG | OXYGEN SATURATION: 98 % | HEIGHT: 66 IN | TEMPERATURE: 98.7 F | BODY MASS INDEX: 27.68 KG/M2 | SYSTOLIC BLOOD PRESSURE: 120 MMHG | WEIGHT: 172.2 LBS

## 2020-06-29 DIAGNOSIS — R19.7 DIARRHEA, UNSPECIFIED TYPE: Primary | ICD-10-CM

## 2020-06-29 DIAGNOSIS — R10.30 LOWER ABDOMINAL PAIN: ICD-10-CM

## 2020-06-29 DIAGNOSIS — R14.3 FLATULENCE: ICD-10-CM

## 2020-06-29 PROCEDURE — 99214 OFFICE O/P EST MOD 30 MIN: CPT | Performed by: NURSE PRACTITIONER

## 2020-06-29 PROCEDURE — 1036F TOBACCO NON-USER: CPT | Performed by: NURSE PRACTITIONER

## 2020-06-29 PROCEDURE — 3008F BODY MASS INDEX DOCD: CPT | Performed by: NURSE PRACTITIONER

## 2020-07-06 ENCOUNTER — APPOINTMENT (OUTPATIENT)
Dept: LAB | Age: 60
End: 2020-07-06
Payer: COMMERCIAL

## 2020-07-06 DIAGNOSIS — R14.3 FLATULENCE: ICD-10-CM

## 2020-07-06 DIAGNOSIS — R10.30 LOWER ABDOMINAL PAIN: ICD-10-CM

## 2020-07-06 DIAGNOSIS — R19.7 DIARRHEA, UNSPECIFIED TYPE: ICD-10-CM

## 2020-07-06 LAB
ALBUMIN SERPL BCP-MCNC: 3.3 G/DL (ref 3.5–5)
ALP SERPL-CCNC: 167 U/L (ref 46–116)
ALT SERPL W P-5'-P-CCNC: 39 U/L (ref 12–78)
ANION GAP SERPL CALCULATED.3IONS-SCNC: 5 MMOL/L (ref 4–13)
AST SERPL W P-5'-P-CCNC: 32 U/L (ref 5–45)
BILIRUB SERPL-MCNC: 0.2 MG/DL (ref 0.2–1)
BUN SERPL-MCNC: 20 MG/DL (ref 5–25)
CALCIUM SERPL-MCNC: 8.9 MG/DL (ref 8.3–10.1)
CHLORIDE SERPL-SCNC: 105 MMOL/L (ref 100–108)
CO2 SERPL-SCNC: 28 MMOL/L (ref 21–32)
CREAT SERPL-MCNC: 1.1 MG/DL (ref 0.6–1.3)
GFR SERPL CREATININE-BSD FRML MDRD: 74 ML/MIN/1.73SQ M
GLUCOSE P FAST SERPL-MCNC: 101 MG/DL (ref 65–99)
POTASSIUM SERPL-SCNC: 4.6 MMOL/L (ref 3.5–5.3)
PROT SERPL-MCNC: 7.9 G/DL (ref 6.4–8.2)
SODIUM SERPL-SCNC: 138 MMOL/L (ref 136–145)

## 2020-07-06 PROCEDURE — 86255 FLUORESCENT ANTIBODY SCREEN: CPT

## 2020-07-06 PROCEDURE — 86003 ALLG SPEC IGE CRUDE XTRC EA: CPT

## 2020-07-06 PROCEDURE — 82785 ASSAY OF IGE: CPT

## 2020-07-06 PROCEDURE — 80053 COMPREHEN METABOLIC PANEL: CPT

## 2020-07-06 PROCEDURE — 82784 ASSAY IGA/IGD/IGG/IGM EACH: CPT

## 2020-07-06 PROCEDURE — 36415 COLL VENOUS BLD VENIPUNCTURE: CPT

## 2020-07-06 PROCEDURE — 86008 ALLG SPEC IGE RECOMB EA: CPT

## 2020-07-06 PROCEDURE — 83516 IMMUNOASSAY NONANTIBODY: CPT

## 2020-07-07 LAB
A-LACTALB IGE QN: <0.1 KAU/I
ALLERGEN COMMENT: ABNORMAL
ALMOND IGE QN: <0.1 KUA/I
B-LACTOGLOB IGE QN: <0.1 KAU/I
CASEIN IGE QN: <0.1 KAU/I
CASHEW NUT IGE QN: <0.1 KUA/I
CODFISH IGE QN: <0.1 KUA/I
EGG WHITE IGE QN: <0.1 KUA/I
ENDOMYSIUM IGA SER QL: NEGATIVE
GLIADIN PEPTIDE IGA SER-ACNC: 6 UNITS (ref 0–19)
GLIADIN PEPTIDE IGG SER-ACNC: 6 UNITS (ref 0–19)
GLUTEN IGE QN: <0.1 KUA/I
HAZELNUT IGE QN: <0.1 KUA/L
IGA SERPL-MCNC: 331 MG/DL (ref 90–386)
MILK IGE QN: 0.12 KUA/I
PEANUT IGE QN: <0.1 KUA/I
SALMON IGE QN: <0.1 KUA/I
SCALLOP IGE QN: <0.1 KUA/L
SESAME SEED IGE QN: <0.1 KUA/I
SHRIMP IGE QN: <0.1 KUA/L
SOYBEAN IGE QN: <0.1 KUA/I
TOTAL IGE SMQN RAST: 81.5 KU/L (ref 0–113)
TTG IGA SER-ACNC: <2 U/ML (ref 0–3)
TTG IGG SER-ACNC: 5 U/ML (ref 0–5)
TUNA IGE QN: <0.1 KUA/I
WALNUT IGE QN: <0.1 KUA/I
WHEAT IGE QN: <0.1 KUA/I

## 2020-07-08 ENCOUNTER — TELEPHONE (OUTPATIENT)
Dept: INTERNAL MEDICINE CLINIC | Facility: CLINIC | Age: 60
End: 2020-07-08

## 2020-07-08 NOTE — TELEPHONE ENCOUNTER
Called pt back again but again no answer  If pt calls and I am unavailable to speak with him, you may inform him that he tested positive for milk allergy which is different from lactose intolerance and he should eat a dairy free diet (milk, cheese, yogurt, ice cream, butter,etc) and update us in 2 weeks regarding symptoms

## 2020-07-08 NOTE — TELEPHONE ENCOUNTER
Attempted to call pt to review labs, no answer so left a message for a call back  He tested positive for a milk allergy and will advise a dairy free diet

## 2020-07-09 NOTE — TELEPHONE ENCOUNTER
Spoke to patient and advised him per your advice  He will call back to let us know how he is doing in 2 weeks

## 2020-07-23 ENCOUNTER — TELEPHONE (OUTPATIENT)
Dept: INTERNAL MEDICINE CLINIC | Facility: CLINIC | Age: 60
End: 2020-07-23

## 2020-07-23 DIAGNOSIS — R19.7 DIARRHEA, UNSPECIFIED TYPE: Primary | ICD-10-CM

## 2020-07-23 NOTE — TELEPHONE ENCOUNTER
Patient called to give an update, he's still experiencing some diarrhea, he's doing the dairy free diet so he's not sure if this is anything related to the dairy allergy

## 2020-07-23 NOTE — TELEPHONE ENCOUNTER
Please let him know that I would like him to see GI and placed a referral in the system so he can call to schedule  Thanks

## 2020-08-05 ENCOUNTER — OFFICE VISIT (OUTPATIENT)
Dept: GASTROENTEROLOGY | Facility: CLINIC | Age: 60
End: 2020-08-05
Payer: COMMERCIAL

## 2020-08-05 VITALS
HEART RATE: 74 BPM | BODY MASS INDEX: 27.66 KG/M2 | DIASTOLIC BLOOD PRESSURE: 76 MMHG | TEMPERATURE: 98.2 F | SYSTOLIC BLOOD PRESSURE: 130 MMHG | HEIGHT: 65 IN | WEIGHT: 166 LBS

## 2020-08-05 DIAGNOSIS — R93.2 ABNORMAL ULTRASOUND OF BILIARY TRACT: ICD-10-CM

## 2020-08-05 DIAGNOSIS — R74.8 ELEVATED ALKALINE PHOSPHATASE LEVEL: ICD-10-CM

## 2020-08-05 DIAGNOSIS — R19.7 DIARRHEA, UNSPECIFIED TYPE: Primary | ICD-10-CM

## 2020-08-05 PROCEDURE — 99244 OFF/OP CNSLTJ NEW/EST MOD 40: CPT | Performed by: INTERNAL MEDICINE

## 2020-08-05 PROCEDURE — 1036F TOBACCO NON-USER: CPT | Performed by: INTERNAL MEDICINE

## 2020-08-05 PROCEDURE — 3008F BODY MASS INDEX DOCD: CPT | Performed by: INTERNAL MEDICINE

## 2020-08-05 RX ORDER — DICYCLOMINE HCL 20 MG
20 TABLET ORAL EVERY 6 HOURS PRN
Qty: 90 TABLET | Refills: 1 | Status: SHIPPED | OUTPATIENT
Start: 2020-08-05 | End: 2021-01-04 | Stop reason: ALTCHOICE

## 2020-08-05 NOTE — PROGRESS NOTES
[facility] - Outpatient Consultation  Adán Irby 63 y o  male MRN: [id number]  Encounter: [id number]          ASSESSMENT AND PLAN:      1  Diarrhea, unspecified type  -differential diagnosis includes IBS, IBD, infectious, pancreatic insufficiency  -check stool studies  -Bentyl as needed, Imodium as needed, continue probiotics  -if workup unrevealing next step would be to consider Xifaxan for IBS-D/SIBO and then ultimately if no improvement will consider repeating colonoscopy with TI intubation and random biopsies to rule out microscopic colitis  - Ambulatory referral to Gastroenterology  - Calprotectin,Fecal; Future  - Clostridium difficile toxin by PCR; Future  - Stool Enteric Bacterial Panel by PCR; Future  - Giardia antigen; Future  - Pancreatic elastase, fecal; Future  - Ova and parasite examination; Future  - dicyclomine (BENTYL) 20 mg tablet; Take 1 tablet (20 mg total) by mouth every 6 (six) hours as needed (abdominal spasms)  Dispense: 90 tablet; Refill: 1    2  Elevated alkaline phosphatase level  3  Abnormal ultrasound of biliary tract  -borderline dilated CBD but isolated alkaline phosphatase in the setting of epigastric pain and loose bowel movements check MRI/MRCP to rule out pancreatic etiology  -rule out chronic hepatitis, autoimmune and PBC  - HILARIA Screen w/ Reflex to Titer/Pattern; Future  - Antimitochondrial antibody; Future  - Hepatitis C antibody; Future  - Hepatitis B core antibody, total; Future  - Hepatitis B surface antigen; Future  - Hepatitis B surface antibody; Future  - Anti-smooth muscle antibody, IgG; Future  - Gamma GT; Future  - Alkaline phosphatase, isoenzymes; Future  - MRI abdomen w wo contrast and mrcp; Future    RTC in 4-6 weeks    ______________________________________________________________________    HPI:  61-year-old male seen in consultation for chronic diarrhea    Patient also has chronically elevated alkaline phosphatase with dilated CBD on ultrasound from 1 year prior  Otherwise patient does not have any major medical problems  Since the beginning of this year patient has had issues with chronic diarrhea which he describes as loose at times watery bowel movements up to 5 times daily  He has been seeing his primary care doctor for management on this  To date he has been tested for celiac disease which is negative with serology, he underwent food allergy testing and this showed have a milk protein allergy  He has been should avoiding milk products for the past 4 weeks however has not noticed much of a change in his bowel habits  He denies blood or melena in stool  Denies excessive NSAID use  He does endorse epigastric pain sometime improved with bowel movement sometimes worsen with stress  Symptoms worse after eating certain types of foods  He reports a GI illness in the winter of 2019 otherwise no recent antibiotics, recent hospitalizations or sick contacts  Last colonoscopy was in 2018 and at that time was normal however there was no TI intubation and random biopsies were not taken at that time as patient did not have current symptoms  No family history of GI tract disorders including pancreatitis, pancreatic cancer, bile duct cancer, colon cancer, celiac disease, IBD  REVIEW OF SYSTEMS:    CONSTITUTIONAL: Denies any fever, chills, rigors, and + 5lb weight loss  HEENT: No earache or tinnitus  Denies hearing loss or visual disturbances  CARDIOVASCULAR: No chest pain or palpitations  RESPIRATORY: Denies any cough, hemoptysis, shortness of breath or dyspnea on exertion  GASTROINTESTINAL: As noted in the History of Present Illness  GENITOURINARY: No problems with urination  Denies any hematuria or dysuria  NEUROLOGIC: No dizziness or vertigo, denies headaches  MUSCULOSKELETAL: Denies any muscle or joint pain  SKIN: Denies skin rashes or itching     ENDOCRINE: Denies excessive thirst  Denies intolerance to heat or cold   PSYCHOSOCIAL: Denies depression or anxiety  Denies any recent memory loss  Historical Information   Past Medical History:   Diagnosis Date    Hemorrhoids     Hyperlipidemia      Past Surgical History:   Procedure Laterality Date    COLONOSCOPY      NASAL SEPTUM SURGERY  1994    PALATE SURGERY  1994    tighten    UVULECTOMY  13     Social History   Social History     Substance and Sexual Activity   Alcohol Use Yes    Comment: socially     Social History     Substance and Sexual Activity   Drug Use Not on file     Social History     Tobacco Use   Smoking Status Never Smoker   Smokeless Tobacco Never Used     Family History   Problem Relation Age of Onset    No Known Problems Mother     Diabetes Father     Heart attack Father     Diabetes Other     Heart disease Family        Meds/Allergies       Current Outpatient Medications:     Probiotic Product (PROBIOTIC DAILY PO)    clotrimazole (LOTRIMIN) 1 % cream    dicyclomine (BENTYL) 20 mg tablet    Multiple Vitamins-Minerals (MULTIVITAMIN WITH MINERALS) tablet    omeprazole (PriLOSEC) 20 mg delayed release capsule    simethicone (MYLICON) 80 mg chewable tablet    Allergies   Allergen Reactions    Penicillins Rash           Objective     Blood pressure 130/76, pulse 74, temperature 98 2 °F (36 8 °C), temperature source Tympanic, height 5' 5" (1 651 m), weight 75 3 kg (166 lb)  Body mass index is 27 62 kg/m²  PHYSICAL EXAM:      General Appearance:   Alert, cooperative, no distress   HEENT:   Normocephalic, atraumatic, anicteric      Neck:  Supple, symmetrical, trachea midline   Lungs:   Clear to auscultation bilaterally; no rales, rhonchi or wheezing; respirations unlabored    Heart[de-identified]   Regular rate and rhythm; no murmur, rub, or gallop     Abdomen:   Soft, non-tender, non-distended; normal bowel sounds; no masses, no organomegaly    Genitalia:   Deferred    Rectal:   Deferred    Extremities:  No cyanosis, clubbing or edema  Skin:  No jaundice, rashes, or lesions    Lymph nodes:  No palpable cervical lymphadenopathy        Lab Results:   No visits with results within 1 Day(s) from this visit  Latest known visit with results is:   Appointment on 07/06/2020   Component Date Value    IgA 07/06/2020 331     Gliadin IgA 07/06/2020 6     Gliadin IgG 07/06/2020 6     Tissue Transglut Ab IGG 07/06/2020 5     TISSUE TRANSGLUTAMINASE * 07/06/2020 <2     Endomysial IgA 07/06/2020 Negative     Sodium 07/06/2020 138     Potassium 07/06/2020 4 6     Chloride 07/06/2020 105     CO2 07/06/2020 28     ANION GAP 07/06/2020 5     BUN 07/06/2020 20     Creatinine 07/06/2020 1 10     Glucose, Fasting 07/06/2020 101*    Calcium 07/06/2020 8 9     AST 07/06/2020 32     ALT 07/06/2020 39     Alkaline Phosphatase 07/06/2020 167*    Total Protein 07/06/2020 7 9     Albumin 07/06/2020 3 3*    Total Bilirubin 07/06/2020 0 20     eGFR 07/06/2020 74     Fish Cod 07/06/2020 <0 10     Egg White 07/06/2020 <0 10     Gluten 07/06/2020 <0 10     Milk, Cow's 07/06/2020 0 12*    Peanut 07/06/2020 <0 10     [place] 07/06/2020 <0 10     Scallop IgE 07/06/2020 <0 10     Sesame Seed IgE 07/06/2020 <0 10     Shrimp 07/06/2020 <0 10     SOYBEAN 07/06/2020 <0 10     Tuna IgE 07/06/2020 <0 10     Sparks 07/06/2020 <0 10     Wheat 07/06/2020 <0 10     Almonds 07/06/2020 <0 10     Cashew 07/06/2020 <0 10     Hazelnut 07/06/2020 <0 10     IgE 07/06/2020 81 5     Allergen Comment 07/06/2020 See Below     Alpha Lactalbumin 07/06/2020 <0 10     Beta Lactoglobulin 07/06/2020 <0 10     Casein 07/06/2020 <0 10          Radiology Results:   No results found

## 2020-08-10 ENCOUNTER — LAB (OUTPATIENT)
Dept: LAB | Facility: CLINIC | Age: 60
End: 2020-08-10
Payer: COMMERCIAL

## 2020-08-10 DIAGNOSIS — R74.8 ELEVATED ALKALINE PHOSPHATASE LEVEL: ICD-10-CM

## 2020-08-10 LAB
GGT SERPL-CCNC: 75 U/L (ref 5–85)
HBV CORE AB SER QL: NORMAL
HBV SURFACE AB SER-ACNC: <3.1 MIU/ML
HBV SURFACE AG SER QL: NORMAL
HCV AB SER QL: NORMAL

## 2020-08-10 PROCEDURE — 86235 NUCLEAR ANTIGEN ANTIBODY: CPT

## 2020-08-10 PROCEDURE — 86704 HEP B CORE ANTIBODY TOTAL: CPT

## 2020-08-10 PROCEDURE — 86256 FLUORESCENT ANTIBODY TITER: CPT

## 2020-08-10 PROCEDURE — 84075 ASSAY ALKALINE PHOSPHATASE: CPT

## 2020-08-10 PROCEDURE — 82977 ASSAY OF GGT: CPT

## 2020-08-10 PROCEDURE — 86803 HEPATITIS C AB TEST: CPT

## 2020-08-10 PROCEDURE — 84080 ASSAY ALKALINE PHOSPHATASES: CPT

## 2020-08-10 PROCEDURE — 86706 HEP B SURFACE ANTIBODY: CPT

## 2020-08-10 PROCEDURE — 87340 HEPATITIS B SURFACE AG IA: CPT

## 2020-08-10 PROCEDURE — 86038 ANTINUCLEAR ANTIBODIES: CPT

## 2020-08-10 PROCEDURE — 36415 COLL VENOUS BLD VENIPUNCTURE: CPT

## 2020-08-11 LAB
ACTIN IGG SERPL-ACNC: 29 UNITS (ref 0–19)
MITOCHONDRIA M2 IGG SER-ACNC: <20 UNITS (ref 0–20)

## 2020-08-12 LAB — RYE IGE QN: NEGATIVE

## 2020-08-15 LAB
ALP BONE CFR SERPL: 10 % (ref 12–68)
ALP INTEST CFR SERPL: 82 % (ref 0–18)
ALP LIVER CFR SERPL: 8 % (ref 13–88)
ALP SERPL-CCNC: 168 IU/L (ref 39–117)

## 2020-08-17 ENCOUNTER — LAB (OUTPATIENT)
Dept: LAB | Facility: CLINIC | Age: 60
End: 2020-08-17
Payer: COMMERCIAL

## 2020-08-17 DIAGNOSIS — R19.7 DIARRHEA, UNSPECIFIED TYPE: ICD-10-CM

## 2020-08-17 PROCEDURE — 83993 ASSAY FOR CALPROTECTIN FECAL: CPT

## 2020-08-17 PROCEDURE — 82656 EL-1 FECAL QUAL/SEMIQ: CPT

## 2020-08-17 PROCEDURE — 87505 NFCT AGENT DETECTION GI: CPT

## 2020-08-17 PROCEDURE — 87209 SMEAR COMPLEX STAIN: CPT

## 2020-08-17 PROCEDURE — 87177 OVA AND PARASITES SMEARS: CPT

## 2020-08-18 LAB
C DIFF TOX GENS STL QL NAA+PROBE: NEGATIVE
CAMPYLOBACTER DNA SPEC NAA+PROBE: NORMAL
SALMONELLA DNA SPEC QL NAA+PROBE: NORMAL
SHIGA TOXIN STX GENE SPEC NAA+PROBE: NORMAL
SHIGELLA DNA SPEC QL NAA+PROBE: NORMAL

## 2020-08-19 LAB
G LAMBLIA AG STL QL IA: NEGATIVE
O+P STL CONC: NORMAL

## 2020-08-24 LAB — ELASTASE PANC STL-MCNT: >500 UG ELAST./G

## 2020-08-26 ENCOUNTER — PREP FOR PROCEDURE (OUTPATIENT)
Dept: GASTROENTEROLOGY | Facility: CLINIC | Age: 60
End: 2020-08-26

## 2020-08-26 ENCOUNTER — TELEPHONE (OUTPATIENT)
Dept: GASTROENTEROLOGY | Facility: CLINIC | Age: 60
End: 2020-08-26

## 2020-08-26 DIAGNOSIS — R10.9 ABDOMINAL PAIN, UNSPECIFIED ABDOMINAL LOCATION: ICD-10-CM

## 2020-08-26 DIAGNOSIS — R19.7 DIARRHEA, UNSPECIFIED TYPE: Primary | ICD-10-CM

## 2020-08-26 RX ORDER — SODIUM, POTASSIUM,MAG SULFATES 17.5-3.13G
177 SOLUTION, RECONSTITUTED, ORAL ORAL ONCE
Qty: 1 BOTTLE | Refills: 0 | Status: SHIPPED | OUTPATIENT
Start: 2020-08-26 | End: 2020-09-23 | Stop reason: ALTCHOICE

## 2020-08-26 NOTE — TELEPHONE ENCOUNTER
Left message on machine to discuss results  So far his stools tests are all within normal limits  We are still awaiting fecal calprotectin however given his symptoms would recommend scheduling EGD/colonoscopy with Dr Coty Dillard within 1 month  Diagnosis chronic diarrhea, epigastric pain  Please help in facilitating this    Thank you

## 2020-08-28 LAB — CALPROTECTIN STL-MCNT: 152 UG/G (ref 0–120)

## 2020-08-30 ENCOUNTER — HOSPITAL ENCOUNTER (OUTPATIENT)
Dept: MRI IMAGING | Facility: HOSPITAL | Age: 60
Discharge: HOME | End: 2020-08-30
Attending: INTERNAL MEDICINE
Payer: COMMERCIAL

## 2020-08-30 DIAGNOSIS — R74.8 ELEVATED ALKALINE PHOSPHATASE LEVEL: ICD-10-CM

## 2020-08-30 DIAGNOSIS — R93.2 ABNORMAL ULTRASOUND OF BILIARY TRACT: ICD-10-CM

## 2020-08-30 PROCEDURE — A9585 GADOBUTROL INJECTION: HCPCS | Performed by: INTERNAL MEDICINE

## 2020-08-30 PROCEDURE — 74183 MRI ABD W/O CNTR FLWD CNTR: CPT

## 2020-08-30 PROCEDURE — G1004 CDSM NDSC: HCPCS

## 2020-08-30 RX ADMIN — GADOBUTROL 8 ML: 604.72 INJECTION INTRAVENOUS at 14:08

## 2020-09-02 ENCOUNTER — TELEPHONE (OUTPATIENT)
Dept: GASTROENTEROLOGY | Facility: CLINIC | Age: 60
End: 2020-09-02

## 2020-09-02 NOTE — TELEPHONE ENCOUNTER
----- Message from Radhika Narayanan MD sent at 9/1/2020 12:25 PM EDT -----  Please schedule EGD/colonoscopy with Dr Isidoro Plata at [facility]  Diagnosis:  Chronic diarrhea and abdominal pain    Thank you

## 2020-09-03 NOTE — TELEPHONE ENCOUNTER
Colon/EGD scheduled for 09/23/20 with Dr Jackson Barrett @[facility] instructions given verbally/mailed to patient

## 2020-09-10 ENCOUNTER — ANESTHESIA EVENT (OUTPATIENT)
Dept: GASTROENTEROLOGY | Facility: MEDICAL CENTER | Age: 60
End: 2020-09-10

## 2020-09-23 ENCOUNTER — HOSPITAL ENCOUNTER (OUTPATIENT)
Dept: GASTROENTEROLOGY | Facility: MEDICAL CENTER | Age: 60
Setting detail: OUTPATIENT SURGERY
Discharge: HOME | End: 2020-09-23
Payer: COMMERCIAL

## 2020-09-23 ENCOUNTER — ANESTHESIA (OUTPATIENT)
Dept: GASTROENTEROLOGY | Facility: MEDICAL CENTER | Age: 60
End: 2020-09-23

## 2020-09-23 VITALS
OXYGEN SATURATION: 100 % | BODY MASS INDEX: 27.66 KG/M2 | RESPIRATION RATE: 16 BRPM | HEIGHT: 65 IN | HEART RATE: 62 BPM | WEIGHT: 166 LBS | DIASTOLIC BLOOD PRESSURE: 65 MMHG | SYSTOLIC BLOOD PRESSURE: 108 MMHG

## 2020-09-23 VITALS — HEART RATE: 64 BPM

## 2020-09-23 DIAGNOSIS — K52.9 INFLAMMATORY BOWEL DISEASE: Primary | ICD-10-CM

## 2020-09-23 DIAGNOSIS — R19.7 DIARRHEA, UNSPECIFIED TYPE: ICD-10-CM

## 2020-09-23 DIAGNOSIS — R10.9 ABDOMINAL PAIN, UNSPECIFIED ABDOMINAL LOCATION: ICD-10-CM

## 2020-09-23 PROCEDURE — 88305 TISSUE EXAM BY PATHOLOGIST: CPT | Performed by: PATHOLOGY

## 2020-09-23 PROCEDURE — NC001 PR NO CHARGE: Performed by: INTERNAL MEDICINE

## 2020-09-23 PROCEDURE — 45380 COLONOSCOPY AND BIOPSY: CPT | Performed by: INTERNAL MEDICINE

## 2020-09-23 PROCEDURE — 43239 EGD BIOPSY SINGLE/MULTIPLE: CPT | Performed by: INTERNAL MEDICINE

## 2020-09-23 RX ORDER — SODIUM CHLORIDE 9 MG/ML
125 INJECTION, SOLUTION INTRAVENOUS CONTINUOUS
Status: DISCONTINUED | OUTPATIENT
Start: 2020-09-23 | End: 2020-09-27 | Stop reason: HOSPADM

## 2020-09-23 RX ORDER — PROPOFOL 10 MG/ML
INJECTION, EMULSION INTRAVENOUS AS NEEDED
Status: DISCONTINUED | OUTPATIENT
Start: 2020-09-23 | End: 2020-09-23

## 2020-09-23 RX ORDER — LIDOCAINE HYDROCHLORIDE 20 MG/ML
INJECTION, SOLUTION EPIDURAL; INFILTRATION; INTRACAUDAL; PERINEURAL AS NEEDED
Status: DISCONTINUED | OUTPATIENT
Start: 2020-09-23 | End: 2020-09-23

## 2020-09-23 RX ORDER — MESALAMINE 1.2 G/1
4800 TABLET, DELAYED RELEASE ORAL
Qty: 120 TABLET | Refills: 3 | Status: SHIPPED | OUTPATIENT
Start: 2020-09-23 | End: 2021-01-11 | Stop reason: SDUPTHER

## 2020-09-23 RX ORDER — PROPOFOL 10 MG/ML
INJECTION, EMULSION INTRAVENOUS CONTINUOUS PRN
Status: DISCONTINUED | OUTPATIENT
Start: 2020-09-23 | End: 2020-09-23

## 2020-09-23 RX ADMIN — LIDOCAINE HYDROCHLORIDE 5 ML: 20 INJECTION, SOLUTION EPIDURAL; INFILTRATION; INTRACAUDAL at 11:02

## 2020-09-23 RX ADMIN — SODIUM CHLORIDE 125 ML/HR: 0.9 INJECTION, SOLUTION INTRAVENOUS at 10:32

## 2020-09-23 RX ADMIN — PROPOFOL 160 MCG/KG/MIN: 10 INJECTION, EMULSION INTRAVENOUS at 11:02

## 2020-09-23 RX ADMIN — PROPOFOL 140 MG: 10 INJECTION, EMULSION INTRAVENOUS at 11:02

## 2020-09-23 RX ADMIN — SODIUM CHLORIDE: 0.9 INJECTION, SOLUTION INTRAVENOUS at 11:20

## 2020-09-23 NOTE — DISCHARGE INSTRUCTIONS

## 2020-09-23 NOTE — ANESTHESIA PREPROCEDURE EVALUATION
Procedure:  COLONOSCOPY  EGD    Relevant Problems   CARDIO   (+) Hyperlipemia   (+) Incomplete right bundle branch block        Physical Exam    Airway    Mallampati score: I  TM Distance: <3 FB  Neck ROM: full     Dental       Cardiovascular  Rhythm: regular, Rate: normal,     Pulmonary  Pulmonary exam normal     Other Findings        Anesthesia Plan  ASA Score- 2     Anesthesia Type- IV sedation with anesthesia with ASA Monitors  Additional Monitors:   Airway Plan:           Plan Factors-Exercise tolerance (METS): >4 METS  Chart reviewed  Patient is not a current smoker  Patient instructed to abstain from smoking on day of procedure  Patient did not smoke on day of surgery  Induction- intravenous      Postoperative Plan-     Informed Consent-

## 2020-09-23 NOTE — H&P
History and Physical -  [facility]  Fermin Youssef 62 y o  male MRN: [id number]                  HPI: Fermin Youssef is a 62y o  year old male who presents for diarrhea and epigastric pain      REVIEW OF SYSTEMS: Per the HPI, and otherwise unremarkable  Historical Information   Past Medical History:   Diagnosis Date    Hemorrhoids     Hyperlipidemia      Past Surgical History:   Procedure Laterality Date    COLONOSCOPY      NASAL SEPTUM SURGERY  1994    PALATE SURGERY  1994    tighten    UVULECTOMY  12     Social History   Social History     Substance and Sexual Activity   Alcohol Use Yes    Comment: socially     Social History     Substance and Sexual Activity   Drug Use Not on file     Social History     Tobacco Use   Smoking Status Never Smoker   Smokeless Tobacco Never Used     Family History   Problem Relation Age of Onset    No Known Problems Mother     Diabetes Father     Heart attack Father     Diabetes Other     Heart disease Family        Meds/Allergies     (Not in a hospital admission)      Allergies   Allergen Reactions    Penicillins Rash       Objective     Blood pressure 110/69, pulse 76, resp  rate 12, height 5' 5" (1 651 m), weight 75 3 kg (166 lb), SpO2 96 %  PHYSICAL EXAMINATION:    General Appearance:   Alert, cooperative, no distress   HEENT:  Normocephalic, atraumatic, anicteric  Neck supple, symmetrical, trachea midline  Lungs:   Equal chest rise and unlabored breathing, normal effort, no coughing  Cardiovascular:   No visualized JVD  Abdomen:   No abdominal distension  Skin:   No jaundice, rashes, or lesions  Musculoskeletal:   Normal range of motion visualized  Psych:  Normal affect and normal insight  Neuro:  Alert and appropriate  ASSESSMENT/PLAN:  This is a 62y o  year old male here for EGD and colonoscopy, and he is stable and optimized for his procedure

## 2020-09-23 NOTE — ANESTHESIA POSTPROCEDURE EVALUATION
Post-Op Assessment Note    CV Status:  Stable    Pain management: adequate     Mental Status:  Alert and awake   Hydration Status:  Euvolemic   PONV Controlled:  Controlled   Airway Patency:  Patent      Post Op Vitals Reviewed: Yes      Staff: Anesthesiologist         No complications documented      /65 (09/23/20 1156)    Temp      Pulse 62 (09/23/20 1156)   Resp 16 (09/23/20 1156)    SpO2 100 % (09/23/20 1156)

## 2020-09-24 ENCOUNTER — APPOINTMENT (OUTPATIENT)
Dept: LAB | Facility: CLINIC | Age: 60
End: 2020-09-24
Payer: COMMERCIAL

## 2020-09-24 DIAGNOSIS — K52.9 INFLAMMATORY BOWEL DISEASE: ICD-10-CM

## 2020-09-24 PROCEDURE — 36415 COLL VENOUS BLD VENIPUNCTURE: CPT

## 2020-09-24 PROCEDURE — 86480 TB TEST CELL IMMUN MEASURE: CPT

## 2020-09-25 LAB
GAMMA INTERFERON BACKGROUND BLD IA-ACNC: 0.01 IU/ML
M TB IFN-G BLD-IMP: NEGATIVE
M TB IFN-G CD4+ BCKGRND COR BLD-ACNC: 0 IU/ML
M TB IFN-G CD4+ BCKGRND COR BLD-ACNC: 0 IU/ML
MITOGEN IGNF BCKGRD COR BLD-ACNC: 2.96 IU/ML

## 2020-10-07 ENCOUNTER — OFFICE VISIT (OUTPATIENT)
Dept: GASTROENTEROLOGY | Facility: CLINIC | Age: 60
End: 2020-10-07
Payer: COMMERCIAL

## 2020-10-07 VITALS
WEIGHT: 177 LBS | HEIGHT: 65 IN | SYSTOLIC BLOOD PRESSURE: 110 MMHG | TEMPERATURE: 97.6 F | HEART RATE: 73 BPM | BODY MASS INDEX: 29.49 KG/M2 | DIASTOLIC BLOOD PRESSURE: 70 MMHG

## 2020-10-07 DIAGNOSIS — K51.00 ULCERATIVE PANCOLITIS WITHOUT COMPLICATION (HCC): Primary | ICD-10-CM

## 2020-10-07 DIAGNOSIS — R10.30 LOWER ABDOMINAL PAIN: ICD-10-CM

## 2020-10-07 DIAGNOSIS — R19.7 DIARRHEA, UNSPECIFIED TYPE: ICD-10-CM

## 2020-10-07 PROCEDURE — 99214 OFFICE O/P EST MOD 30 MIN: CPT | Performed by: INTERNAL MEDICINE

## 2020-10-07 PROCEDURE — 1036F TOBACCO NON-USER: CPT | Performed by: INTERNAL MEDICINE

## 2020-12-08 ENCOUNTER — TELEPHONE (OUTPATIENT)
Dept: GASTROENTEROLOGY | Facility: CLINIC | Age: 60
End: 2020-12-08

## 2021-01-04 ENCOUNTER — OFFICE VISIT (OUTPATIENT)
Dept: INTERNAL MEDICINE CLINIC | Facility: CLINIC | Age: 61
End: 2021-01-04
Payer: COMMERCIAL

## 2021-01-04 VITALS
HEIGHT: 65 IN | BODY MASS INDEX: 29.89 KG/M2 | TEMPERATURE: 98 F | DIASTOLIC BLOOD PRESSURE: 68 MMHG | WEIGHT: 179.4 LBS | HEART RATE: 73 BPM | SYSTOLIC BLOOD PRESSURE: 120 MMHG | OXYGEN SATURATION: 98 %

## 2021-01-04 DIAGNOSIS — Z11.59 NEED FOR HEPATITIS C SCREENING TEST: ICD-10-CM

## 2021-01-04 DIAGNOSIS — R73.02 GLUCOSE INTOLERANCE (IMPAIRED GLUCOSE TOLERANCE): ICD-10-CM

## 2021-01-04 DIAGNOSIS — Z13.1 SCREENING FOR DIABETES MELLITUS: ICD-10-CM

## 2021-01-04 DIAGNOSIS — Z13.0 SCREENING FOR DEFICIENCY ANEMIA: ICD-10-CM

## 2021-01-04 DIAGNOSIS — Z12.5 PROSTATE CANCER SCREENING: ICD-10-CM

## 2021-01-04 DIAGNOSIS — K51.00 ULCERATIVE PANCOLITIS WITHOUT COMPLICATION (HCC): ICD-10-CM

## 2021-01-04 DIAGNOSIS — E78.5 HYPERLIPIDEMIA, UNSPECIFIED HYPERLIPIDEMIA TYPE: ICD-10-CM

## 2021-01-04 DIAGNOSIS — Z00.00 HEALTH MAINTENANCE EXAMINATION: Primary | ICD-10-CM

## 2021-01-04 PROBLEM — L84 CALLUS OF FOOT: Status: RESOLVED | Noted: 2019-12-30 | Resolved: 2021-01-04

## 2021-01-04 PROBLEM — R10.30 LOWER ABDOMINAL PAIN: Status: RESOLVED | Noted: 2020-06-29 | Resolved: 2021-01-04

## 2021-01-04 PROBLEM — R19.7 DIARRHEA: Status: RESOLVED | Noted: 2020-06-29 | Resolved: 2021-01-04

## 2021-01-04 PROBLEM — K51.90 ULCERATIVE COLITIS (HCC): Status: ACTIVE | Noted: 2021-01-04

## 2021-01-04 PROCEDURE — 1036F TOBACCO NON-USER: CPT | Performed by: INTERNAL MEDICINE

## 2021-01-04 PROCEDURE — 99396 PREV VISIT EST AGE 40-64: CPT | Performed by: INTERNAL MEDICINE

## 2021-01-04 NOTE — ASSESSMENT & PLAN NOTE
I have reviewed with the patient his ulcerative colitis diagnosis and recommendations for treatment of continuing mesalamine at 4800 mg daily at breakfast time  Of patient understands the nature of the condition is autoimmune and the need for regular followups with his gastroenterologist   Most recent gastroenterology note was reviewed during today's visit  His symptoms currently seem to be controlled and stable

## 2021-01-04 NOTE — ASSESSMENT & PLAN NOTE
Healthcare maintenance examination performed today review of the patient's most recent diagnosis of ulcerative colitis was performed  He is up-to-date on screening for colon cancer due to recent colonoscopy for diagnosis of his ulcerative colitis  He is due for PSA testing for prostate cancer evaluation  He had a hepatitis C screening test within the past 6 months

## 2021-01-04 NOTE — ASSESSMENT & PLAN NOTE
Hyperlipidemia was reviewed today  Mild previous elevations noted on previous blood work a request for an updated study was provided to the patient and we will review the results with him when they are completed

## 2021-01-04 NOTE — PROGRESS NOTES
Assessment/Plan:    Ulcerative colitis ([facility] [address] )   I have reviewed with the patient his ulcerative colitis diagnosis and recommendations for treatment of continuing mesalamine at 4800 mg daily at breakfast time  Of patient understands the nature of the condition is autoimmune and the need for regular followups with his gastroenterologist   Most recent gastroenterology note was reviewed during today's visit  His symptoms currently seem to be controlled and stable  Glucose intolerance (impaired glucose tolerance)    Previous mild elevation the patient's fasting blood sugar will be followed up with a comprehensive metabolic profile I have advised the patient to be cautious about consuming concentrated sugars and excessive carbohydrates  His body mass index is slightly above normal with a reading of 29 85 the benefits of weight reduction were reviewed today  Recommend decreased calorie consumption by 25% daily    Hyperlipemia   Hyperlipidemia was reviewed today  Mild previous elevations noted on previous blood work a request for an updated study was provided to the patient and we will review the results with him when they are completed  Health maintenance examination    Healthcare maintenance examination performed today review of the patient's most recent diagnosis of ulcerative colitis was performed  He is up-to-date on screening for colon cancer due to recent colonoscopy for diagnosis of his ulcerative colitis  He is due for PSA testing for prostate cancer evaluation  He had a hepatitis C screening test within the past 6 months  Diagnoses and all orders for this visit:    Glucose intolerance (impaired glucose tolerance)  -     Comprehensive metabolic panel; Future    Health maintenance examination    Screening for diabetes mellitus  -     Comprehensive metabolic panel;  Future    Screening for deficiency anemia  -     CBC and differential; Future    Prostate cancer screening  -     PSA, Total Screen; Future    Need for hepatitis C screening test  -     Cancel: Hepatitis C antibody; Future    Hyperlipidemia, unspecified hyperlipidemia type  -     Lipid panel; Future    Ulcerative pancolitis without complication (HCC)        Subjective:      Patient ID: Mart Petersen is a 62 y o  male  This 43-year-old gentleman returns today for an annual physical examination  Since his last visit with us he has been diagnosed with ulcerative colitis and we reviewed the findings of his colonoscopy pathology and consultation with gastroenterology services at [address] [place] [address] and reports significant improvement in his symptoms  He denies any rectal bleeding abdominal pain cramping at this time  He has been taking a probiotic but decided recently to discontinue this supplement  The only other medication he is currently on is a multiple vitamin daily  He has had no chest pains palpitations shortness of breath and has had no signs or symptoms of active COVID-19 infection  He works for [employer] and indicates that they have been very busy over the past year due to the COVID-19 epidemic  The following portions of the patient's history were reviewed and updated as appropriate:   He  has a past medical history of Hemorrhoids and Hyperlipidemia  He   Patient Active Problem List    Diagnosis Date Noted    [facility] 01/04/2021    Lactose intolerance 12/30/2019    Peyronie disease 12/30/2019    Flatulence 06/13/2019    Health maintenance examination 12/17/2018    Overweight (BMI 25 0-29 9) 12/11/2017    Incomplete right bundle branch block 12/11/2017    Hyperlipemia 12/05/2016    Glucose intolerance (impaired glucose tolerance) 12/05/2016     He  has a past surgical history that includes Nasal septum surgery (1994); Uvulectomy (1994); Palate surgery (1994); Colonoscopy; and Upper gastrointestinal endoscopy    His family history includes Diabetes in his father and other; Heart attack in his father; Heart disease in his family; No Known Problems in his mother  He  reports that he has never smoked  He has never used smokeless tobacco  He reports current alcohol use  No history on file for drug  Current Outpatient Medications   Medication Sig Dispense Refill    mesalamine (LIALDA) 1 2 g EC tablet Take 4 tablets (4 8 g total) by mouth daily with breakfast 120 tablet 3    Multiple Vitamins-Minerals (MULTIVITAMIN WITH MINERALS) tablet Take 1 tablet by mouth daily      Probiotic Product (PROBIOTIC DAILY PO) Take by mouth       No current facility-administered medications for this visit       Review of Systems   Skin:          Pruritus   All other systems reviewed and are negative  Objective:      /68   Pulse 73   Temp 98 °F (36 7 °C) (Tympanic)   Ht 5' 5" (1 651 m)   Wt 81 4 kg (179 lb 6 4 oz)   SpO2 98%   BMI 29 85 kg/m²          Physical Exam  Constitutional:       General: He is not in acute distress  Appearance: He is well-developed  He is not ill-appearing  HENT:      Head: Normocephalic  Right Ear: Hearing, tympanic membrane, ear canal and external ear normal       Left Ear: Hearing, tympanic membrane, ear canal and external ear normal       Nose: Nose normal       Mouth/Throat:      Mouth: Mucous membranes are moist       Pharynx: Oropharynx is clear  No oropharyngeal exudate  Eyes:      General: No scleral icterus  Right eye: No discharge  Left eye: No discharge  Conjunctiva/sclera: Conjunctivae normal       Pupils: Pupils are equal, round, and reactive to light  Neck:      Musculoskeletal: Normal range of motion and neck supple  No neck rigidity or muscular tenderness  Thyroid: No thyromegaly  Vascular: No carotid bruit  Cardiovascular:      Rate and Rhythm: Normal rate and regular rhythm  Heart sounds: Normal heart sounds, S1 normal and S2 normal  No murmur     Pulmonary:      Effort: Pulmonary effort is normal  Breath sounds: Normal breath sounds  No wheezing, rhonchi or rales  Abdominal:      General: Bowel sounds are normal  There is no distension  Palpations: Abdomen is soft  There is no mass  Tenderness: There is no abdominal tenderness  There is no guarding  Hernia: No hernia is present  There is no hernia in the left inguinal area or right inguinal area  Genitourinary:     Penis: Normal        Scrotum/Testes: Normal       Epididymis:      Right: Normal       Left: Normal       Prostate: Normal       Rectum: External hemorrhoid present  Musculoskeletal: Normal range of motion  General: No swelling, tenderness, deformity or signs of injury  Lymphadenopathy:      Cervical: No cervical adenopathy  Lower Body: No right inguinal adenopathy  No left inguinal adenopathy  Skin:     General: Skin is warm and dry  Coloration: Skin is not jaundiced or pale  Findings: No bruising or erythema  Neurological:      General: No focal deficit present  Mental Status: He is alert and oriented to person, place, and time  Mental status is at baseline  Deep Tendon Reflexes: Reflexes are normal and symmetric  Reflexes normal    Psychiatric:         Mood and Affect: Mood normal          Behavior: Behavior normal          Thought Content: Thought content normal          Judgment: Judgment normal        BMI Counseling: Body mass index is 29 85 kg/m²   The BMI

## 2021-01-04 NOTE — ASSESSMENT & PLAN NOTE
Previous mild elevation the patient's fasting blood sugar will be followed up with a comprehensive metabolic profile I have advised the patient to be cautious about consuming concentrated sugars and excessive carbohydrates  His body mass index is slightly above normal with a reading of 29 85 the benefits of weight reduction were reviewed today    Recommend decreased calorie consumption by 25% daily

## 2021-01-11 ENCOUNTER — LAB (OUTPATIENT)
Dept: LAB | Facility: CLINIC | Age: 61
End: 2021-01-11
Payer: COMMERCIAL

## 2021-01-11 DIAGNOSIS — K52.9 INFLAMMATORY BOWEL DISEASE: ICD-10-CM

## 2021-01-11 DIAGNOSIS — Z13.0 SCREENING FOR DEFICIENCY ANEMIA: ICD-10-CM

## 2021-01-11 DIAGNOSIS — E78.5 HYPERLIPIDEMIA, UNSPECIFIED HYPERLIPIDEMIA TYPE: ICD-10-CM

## 2021-01-11 DIAGNOSIS — K51.00 ULCERATIVE PANCOLITIS WITHOUT COMPLICATION (HCC): ICD-10-CM

## 2021-01-11 DIAGNOSIS — R73.02 GLUCOSE INTOLERANCE (IMPAIRED GLUCOSE TOLERANCE): ICD-10-CM

## 2021-01-11 DIAGNOSIS — Z12.5 PROSTATE CANCER SCREENING: ICD-10-CM

## 2021-01-11 DIAGNOSIS — Z13.1 SCREENING FOR DIABETES MELLITUS: ICD-10-CM

## 2021-01-11 LAB
ALBUMIN SERPL BCP-MCNC: 3.3 G/DL (ref 3.5–5)
ALP SERPL-CCNC: 108 U/L (ref 46–116)
ALT SERPL W P-5'-P-CCNC: 28 U/L (ref 12–78)
ANION GAP SERPL CALCULATED.3IONS-SCNC: 1 MMOL/L (ref 4–13)
AST SERPL W P-5'-P-CCNC: 22 U/L (ref 5–45)
BASOPHILS # BLD AUTO: 0.05 THOUSANDS/ΜL (ref 0–0.1)
BASOPHILS NFR BLD AUTO: 1 % (ref 0–1)
BILIRUB SERPL-MCNC: 0.46 MG/DL (ref 0.2–1)
BUN SERPL-MCNC: 17 MG/DL (ref 5–25)
CALCIUM ALBUM COR SERPL-MCNC: 9.7 MG/DL (ref 8.3–10.1)
CALCIUM SERPL-MCNC: 9.1 MG/DL (ref 8.3–10.1)
CHLORIDE SERPL-SCNC: 109 MMOL/L (ref 100–108)
CHOLEST SERPL-MCNC: 168 MG/DL (ref 50–200)
CO2 SERPL-SCNC: 30 MMOL/L (ref 21–32)
CREAT SERPL-MCNC: 1.13 MG/DL (ref 0.6–1.3)
CRP SERPL QL: 4.5 MG/L
EOSINOPHIL # BLD AUTO: 0.25 THOUSAND/ΜL (ref 0–0.61)
EOSINOPHIL NFR BLD AUTO: 5 % (ref 0–6)
ERYTHROCYTE [DISTWIDTH] IN BLOOD BY AUTOMATED COUNT: 12.2 % (ref 11.6–15.1)
GFR SERPL CREATININE-BSD FRML MDRD: 71 ML/MIN/1.73SQ M
GLUCOSE P FAST SERPL-MCNC: 90 MG/DL (ref 65–99)
HCT VFR BLD AUTO: 42.5 % (ref 36.5–49.3)
HDLC SERPL-MCNC: 38 MG/DL
HGB BLD-MCNC: 13.6 G/DL (ref 12–17)
IMM GRANULOCYTES # BLD AUTO: 0.01 THOUSAND/UL (ref 0–0.2)
IMM GRANULOCYTES NFR BLD AUTO: 0 % (ref 0–2)
LDLC SERPL CALC-MCNC: 110 MG/DL (ref 0–100)
LYMPHOCYTES # BLD AUTO: 1.18 THOUSANDS/ΜL (ref 0.6–4.47)
LYMPHOCYTES NFR BLD AUTO: 21 % (ref 14–44)
MCH RBC QN AUTO: 28.9 PG (ref 26.8–34.3)
MCHC RBC AUTO-ENTMCNC: 32 G/DL (ref 31.4–37.4)
MCV RBC AUTO: 90 FL (ref 82–98)
MONOCYTES # BLD AUTO: 0.63 THOUSAND/ΜL (ref 0.17–1.22)
MONOCYTES NFR BLD AUTO: 11 % (ref 4–12)
NEUTROPHILS # BLD AUTO: 3.43 THOUSANDS/ΜL (ref 1.85–7.62)
NEUTS SEG NFR BLD AUTO: 62 % (ref 43–75)
NONHDLC SERPL-MCNC: 130 MG/DL
NRBC BLD AUTO-RTO: 0 /100 WBCS
PLATELET # BLD AUTO: 380 THOUSANDS/UL (ref 149–390)
PMV BLD AUTO: 9.7 FL (ref 8.9–12.7)
POTASSIUM SERPL-SCNC: 4.7 MMOL/L (ref 3.5–5.3)
PROT SERPL-MCNC: 7.2 G/DL (ref 6.4–8.2)
PSA SERPL-MCNC: 0.9 NG/ML (ref 0–4)
RBC # BLD AUTO: 4.7 MILLION/UL (ref 3.88–5.62)
SODIUM SERPL-SCNC: 140 MMOL/L (ref 136–145)
TRIGL SERPL-MCNC: 101 MG/DL
WBC # BLD AUTO: 5.55 THOUSAND/UL (ref 4.31–10.16)

## 2021-01-11 PROCEDURE — 86140 C-REACTIVE PROTEIN: CPT

## 2021-01-11 PROCEDURE — 85025 COMPLETE CBC W/AUTO DIFF WBC: CPT

## 2021-01-11 PROCEDURE — 80053 COMPREHEN METABOLIC PANEL: CPT

## 2021-01-11 PROCEDURE — 36415 COLL VENOUS BLD VENIPUNCTURE: CPT

## 2021-01-11 PROCEDURE — G0103 PSA SCREENING: HCPCS

## 2021-01-11 PROCEDURE — 80061 LIPID PANEL: CPT

## 2021-01-11 RX ORDER — MESALAMINE 1.2 G/1
4800 TABLET, DELAYED RELEASE ORAL
Qty: 120 TABLET | Refills: 4 | Status: SHIPPED | OUTPATIENT
Start: 2021-01-11 | End: 2021-06-14 | Stop reason: SDUPTHER

## 2021-01-11 NOTE — TELEPHONE ENCOUNTER
GI Physician: Dr Chadwick Connor    Refill Request for Medication: mesalamine    Dose: 4800 mg    Quantity: 120 tablet    Pharmacy and Location: [facility]    APPT:  1/27/21

## 2021-01-22 ENCOUNTER — LAB (OUTPATIENT)
Dept: LAB | Facility: CLINIC | Age: 61
End: 2021-01-22
Payer: COMMERCIAL

## 2021-01-22 DIAGNOSIS — K51.00 ULCERATIVE PANCOLITIS WITHOUT COMPLICATION (HCC): ICD-10-CM

## 2021-01-22 PROCEDURE — 83993 ASSAY FOR CALPROTECTIN FECAL: CPT

## 2021-01-27 ENCOUNTER — OFFICE VISIT (OUTPATIENT)
Dept: GASTROENTEROLOGY | Facility: CLINIC | Age: 61
End: 2021-01-27
Payer: COMMERCIAL

## 2021-01-27 VITALS
SYSTOLIC BLOOD PRESSURE: 98 MMHG | WEIGHT: 180.8 LBS | HEART RATE: 76 BPM | BODY MASS INDEX: 30.12 KG/M2 | HEIGHT: 65 IN | TEMPERATURE: 96.7 F | DIASTOLIC BLOOD PRESSURE: 64 MMHG

## 2021-01-27 DIAGNOSIS — R19.7 DIARRHEA, UNSPECIFIED TYPE: ICD-10-CM

## 2021-01-27 DIAGNOSIS — R10.30 LOWER ABDOMINAL PAIN: ICD-10-CM

## 2021-01-27 DIAGNOSIS — K51.00 ULCERATIVE PANCOLITIS WITHOUT COMPLICATION (HCC): Primary | ICD-10-CM

## 2021-01-27 PROCEDURE — 99214 OFFICE O/P EST MOD 30 MIN: CPT | Performed by: INTERNAL MEDICINE

## 2021-01-27 PROCEDURE — 3008F BODY MASS INDEX DOCD: CPT | Performed by: INTERNAL MEDICINE

## 2021-01-27 RX ORDER — HYOSCYAMINE SULFATE 0.125 MG
0.12 TABLET ORAL EVERY 4 HOURS PRN
Qty: 90 TABLET | Refills: 3 | Status: SHIPPED | OUTPATIENT
Start: 2021-01-27 | End: 2022-01-10 | Stop reason: ALTCHOICE

## 2021-01-27 NOTE — PROGRESS NOTES
[facility] - Outpatient Follow-up Note  Manda Verdin 62 y o  male MRN: [id number]  Encounter: [id number]          ASSESSMENT AND PLAN:      1  Ulcerative pancolitis without complication ([address] )  2  Diarrhea, unspecified type  -diagnosed on colonoscopy 09/2020 involving the entire colon  -currently on Lialda 4 8 g daily for induction  -denies extraintestinal manifestations  -bordering on moderate classification and might need to consider escalation of therapy possibly with Jay Goldberg or Rocio Eli  -recent fecal calprotectin is pending  -recent worsening of his symptoms may be due to inadequate control the inflammation versus infectious versus IBS  -check C diff and stool culture, start Levsin  -if no improvement in his symptoms in 1 month will consider flexible sigmoidoscopy for reassessment and possible need for escalation of therapy  -discuss health maintenance at next visit  - Clostridium difficile toxin by PCR; Future  - Stool Enteric Bacterial Panel by PCR; Future  - hyoscyamine (ANASPAZ,LEVSIN) 0 125 MG tablet; Take 1 tablet (0 125 mg total) by mouth every 4 (four) hours as needed for cramping  Dispense: 90 tablet; Refill: 3    3  Lower abdominal pain  -trial of Levsin  - hyoscyamine (ANASPAZ,LEVSIN) 0 125 MG tablet; Take 1 tablet (0 125 mg total) by mouth every 4 (four) hours as needed for cramping  Dispense: 90 tablet; Refill: 3    RTC in 1 mos  ______________________________________________________________________    SUBJECTIVE:  52-year-old male seen in follow-up for pan colonic ulcerative colitis diagnosed 09/2020  Induction medication was Lialda 4 8 g per day initially with improvement in symptoms however over the past few weeks she has noted increased in loose bowel movements without blood up to a peak of 6-7 times per day however this has improved to 4 times per day  He denies nausea, vomiting but endorses intermittent abdominal pain        REVIEW OF SYSTEMS IS OTHERWISE NEGATIVE  Historical Information   Past Medical History:   Diagnosis Date    Hemorrhoids     Hyperlipidemia      Past Surgical History:   Procedure Laterality Date    COLONOSCOPY      NASAL SEPTUM SURGERY  1994    PALATE SURGERY  1994    tighten    UPPER GASTROINTESTINAL ENDOSCOPY      UVULECTOMY  1994     Social History   Social History     Substance and Sexual Activity   Alcohol Use Yes    Comment: socially     Social History     Substance and Sexual Activity   Drug Use Not on file     Social History     Tobacco Use   Smoking Status Never Smoker   Smokeless Tobacco Never Used     Family History   Problem Relation Age of Onset    No Known Problems Mother     Diabetes Father     Heart attack Father     Diabetes Other     Heart disease Family        Meds/Allergies       Current Outpatient Medications:     mesalamine (LIALDA) 1 2 g EC tablet    Multiple Vitamins-Minerals (MULTIVITAMIN WITH MINERALS) tablet    hyoscyamine (ANASPAZ,LEVSIN) 0 125 MG tablet    Probiotic Product (PROBIOTIC DAILY PO)    Allergies   Allergen Reactions    Penicillins Rash           Objective     Blood pressure 98/64, pulse 76, temperature (!) 96 7 °F (35 9 °C), temperature source Tympanic, height 5' 5" (1 651 m), weight 82 kg (180 lb 12 8 oz)  Body mass index is 30 09 kg/m²  PHYSICAL EXAM:      General Appearance:   Alert, cooperative, no distress   HEENT:   Normocephalic, atraumatic, anicteric      Neck:  Supple, symmetrical, trachea midline   Lungs:   Clear to auscultation bilaterally; no rales, rhonchi or wheezing; respirations unlabored    Heart[de-identified]   Regular rate and rhythm; no murmur, rub, or gallop     Abdomen:   Soft, non-tender, non-distended; normal bowel sounds; no masses, no organomegaly    Genitalia:   Deferred    Rectal:   Deferred    Extremities:  No cyanosis, clubbing or edema        Skin:  No jaundice, rashes, or lesions    Lymph nodes:  No palpable cervical lymphadenopathy        Lab Results:   No visits with results within 1 Day(s) from this visit  Latest known visit with results is:   Lab on 01/11/2021   Component Date Value    CRP 01/11/2021 4 5*    WBC 01/11/2021 5 55     RBC 01/11/2021 4 70     Hemoglobin 01/11/2021 13 6     Hematocrit 01/11/2021 42 5     MCV 01/11/2021 90     MCH 01/11/2021 28 9     MCHC 01/11/2021 32 0     RDW 01/11/2021 12 2     MPV 01/11/2021 9 7     Platelets 57/76/4461 380     nRBC 01/11/2021 0     Neutrophils Relative 01/11/2021 62     Immat GRANS % 01/11/2021 0     Lymphocytes Relative 01/11/2021 21     Monocytes Relative 01/11/2021 11     Eosinophils Relative 01/11/2021 5     Basophils Relative 01/11/2021 1     Neutrophils Absolute 01/11/2021 3 43     Immature Grans Absolute 01/11/2021 0 01     Lymphocytes Absolute 01/11/2021 1 18     Monocytes Absolute 01/11/2021 0 63     Eosinophils Absolute 01/11/2021 0 25     Basophils Absolute 01/11/2021 0 05     PSA 01/11/2021 0 9     Cholesterol 01/11/2021 168     Triglycerides 01/11/2021 101     HDL, Direct 01/11/2021 38*    LDL Calculated 01/11/2021 110*    Non-HDL-Chol (CHOL-HDL) 01/11/2021 130     Sodium 01/11/2021 140     Potassium 01/11/2021 4 7     Chloride 01/11/2021 109*    CO2 01/11/2021 30     ANION GAP 01/11/2021 1*    BUN 01/11/2021 17     Creatinine 01/11/2021 1 13     Glucose, Fasting 01/11/2021 90     Calcium 01/11/2021 9 1     Corrected Calcium 01/11/2021 9 7     AST 01/11/2021 22     ALT 01/11/2021 28     Alkaline Phosphatase 01/11/2021 108     Total Protein 01/11/2021 7 2     Albumin 01/11/2021 3 3*    Total Bilirubin 01/11/2021 0 46     eGFR 01/11/2021 71          Radiology Results:   No results found

## 2021-01-28 LAB — CALPROTECTIN STL-MCNT: 470 UG/G (ref 0–120)

## 2021-02-08 ENCOUNTER — LAB (OUTPATIENT)
Dept: LAB | Facility: CLINIC | Age: 61
End: 2021-02-08
Payer: COMMERCIAL

## 2021-02-08 DIAGNOSIS — R19.7 DIARRHEA, UNSPECIFIED TYPE: ICD-10-CM

## 2021-02-08 PROCEDURE — 87505 NFCT AGENT DETECTION GI: CPT

## 2021-02-09 LAB
C DIFF TOX B TCDB STL QL NAA+PROBE: NEGATIVE
CAMPYLOBACTER DNA SPEC NAA+PROBE: NORMAL
SALMONELLA DNA SPEC QL NAA+PROBE: NORMAL
SHIGA TOXIN STX GENE SPEC NAA+PROBE: NORMAL
SHIGELLA DNA SPEC QL NAA+PROBE: NORMAL

## 2021-02-19 ENCOUNTER — TELEPHONE (OUTPATIENT)
Dept: GASTROENTEROLOGY | Facility: AMBULARY SURGERY CENTER | Age: 61
End: 2021-02-19

## 2021-02-19 NOTE — TELEPHONE ENCOUNTER
Patients GI provider:  Dr Raymon Perez    Number to return call: [phone]    Reason for call: Pt calling stated he is returning a call from Dr Raymon Perez     Scheduled procedure/appointment date if applicable: Apt/procedure n/a

## 2021-02-23 NOTE — TELEPHONE ENCOUNTER
Called patient however was not available  Left detailed message on phone to review results that there was no evidence of infection in his stool    I also advised him to call the office and let them know how he is doing clinically with regards to the diarrhea has been may need to consider doing a flexible sigmoidoscopy to reassess his ulcerative colitis

## 2021-02-24 ENCOUNTER — OFFICE VISIT (OUTPATIENT)
Dept: GASTROENTEROLOGY | Facility: CLINIC | Age: 61
End: 2021-02-24
Payer: COMMERCIAL

## 2021-02-24 ENCOUNTER — LAB (OUTPATIENT)
Dept: LAB | Facility: CLINIC | Age: 61
End: 2021-02-24
Payer: COMMERCIAL

## 2021-02-24 VITALS
WEIGHT: 174 LBS | BODY MASS INDEX: 27.97 KG/M2 | DIASTOLIC BLOOD PRESSURE: 80 MMHG | HEIGHT: 66 IN | TEMPERATURE: 80.1 F | SYSTOLIC BLOOD PRESSURE: 102 MMHG

## 2021-02-24 DIAGNOSIS — K58.0 IRRITABLE BOWEL SYNDROME WITH DIARRHEA: ICD-10-CM

## 2021-02-24 DIAGNOSIS — K51.00 ULCERATIVE PANCOLITIS WITHOUT COMPLICATION (HCC): ICD-10-CM

## 2021-02-24 DIAGNOSIS — K51.00 ULCERATIVE PANCOLITIS WITHOUT COMPLICATION (HCC): Primary | ICD-10-CM

## 2021-02-24 LAB — CRP SERPL QL: 6.2 MG/L

## 2021-02-24 PROCEDURE — 3008F BODY MASS INDEX DOCD: CPT | Performed by: INTERNAL MEDICINE

## 2021-02-24 PROCEDURE — 1036F TOBACCO NON-USER: CPT | Performed by: INTERNAL MEDICINE

## 2021-02-24 PROCEDURE — 36415 COLL VENOUS BLD VENIPUNCTURE: CPT

## 2021-02-24 PROCEDURE — 99214 OFFICE O/P EST MOD 30 MIN: CPT | Performed by: INTERNAL MEDICINE

## 2021-02-24 PROCEDURE — 86140 C-REACTIVE PROTEIN: CPT

## 2021-02-24 NOTE — PROGRESS NOTES
[facility] - Outpatient Follow-up Note  Martha Richards 62 y o  male MRN: [id number]  Encounter: [id number]          ASSESSMENT AND PLAN:      1  Ulcerative pancolitis without complication ([address] )  2  Irritable bowel syndrome with diarrhea  -increased diarrhea(now improved) possibly due to a viral illness but no evidence of bacterial origin  -defer invasive endoscopic evaluation at this time  -assess for inflammation it noninvasive CRP and fecal calprotectin  -continue Lialda 4 8 g daily for now  -continue Levsin  -if inflammatory markers are not decreasing or symptoms worsen will then pursue flexible sigmoidoscopy to assess for endoscopic and histological staging    Return to office in 2 months    ______________________________________________________________________    SUBJECTIVE:  64-year-old male seen in follow-up for ulcerative colitis and brief  Worsening of diarrhea  He presents in one-month follow-up for increased amount of nonbloody bowel movements since December  He underwent stool testing which ruled out infectious etiology  In the interim his symptoms continue to improve  He continues to take Levsin 3-4 times daily with improvement in symptoms  He will occasionally have no bowel movements for 1-2 days and then have 4 bowel movements nonbloody less urgency  REVIEW OF SYSTEMS IS OTHERWISE NEGATIVE        Historical Information   Past Medical History:   Diagnosis Date    Hemorrhoids     Hyperlipidemia      Past Surgical History:   Procedure Laterality Date    COLONOSCOPY      NASAL SEPTUM SURGERY  1994    PALATE SURGERY  1994    tighten    UPPER GASTROINTESTINAL ENDOSCOPY      UVULECTOMY  1994     Social History   Social History     Substance and Sexual Activity   Alcohol Use Yes    Comment: socially     Social History     Substance and Sexual Activity   Drug Use Not on file     Social History     Tobacco Use   Smoking Status Never Smoker   Smokeless Tobacco Never Used     Family History   Problem Relation Age of Onset    No Known Problems Mother     Diabetes Father     Heart attack Father     Diabetes Other     Heart disease Family        Meds/Allergies       Current Outpatient Medications:     hyoscyamine (ANASPAZ,LEVSIN) 0 125 MG tablet    Multiple Vitamins-Minerals (MULTIVITAMIN WITH MINERALS) tablet    Probiotic Product (PROBIOTIC DAILY PO)    mesalamine (LIALDA) 1 2 g EC tablet    Allergies   Allergen Reactions    Penicillins Rash           Objective     Blood pressure 102/80, temperature (!) 80 1 °F (26 7 °C), temperature source Tympanic, height 5' 6" (1 676 m), weight 78 9 kg (174 lb)  Body mass index is 28 08 kg/m²  PHYSICAL EXAM:      General Appearance:   Alert, cooperative, no distress   HEENT:   Normocephalic, atraumatic, anicteric      Neck:  Supple, symmetrical, trachea midline   Lungs:   Clear to auscultation bilaterally; no rales, rhonchi or wheezing; respirations unlabored    Heart[de-identified]   Regular rate and rhythm; no murmur, rub, or gallop  Abdomen:   Soft, non-tender, non-distended; normal bowel sounds; no masses, no organomegaly    Genitalia:   Deferred    Rectal:   Deferred    Extremities:  No cyanosis, clubbing or edema        Skin:  No jaundice, rashes, or lesions    Lymph nodes:  No palpable cervical lymphadenopathy        Lab Results:   No visits with results within 1 Day(s) from this visit  Latest known visit with results is:   Lab on 02/08/2021   Component Date Value     C difficile toxin by PC* 02/08/2021 Negative     Salmonella sp PCR 02/08/2021 None Detected     Shigella sp/Enteroinvasi* 02/08/2021 None Detected     Campylobacter sp (jejuni* 02/08/2021 None Detected     Shiga toxin 1/Shiga toxi* 02/08/2021 None Detected          Radiology Results:   No results found

## 2021-02-24 NOTE — LETTER
February 24, 2021    Abdoulaye Mariano      To Whom It May Concern:    Cheryl Villaseñor is currently being treated at  [place] [address] for Ulcerative Colitis  The natural histroy of this disease necessitates frequent and unexpected use of bathroom facilities  Please reach out with any further questions at [phone]                  Sincerely,        Ovi De Dios MD        CC: No Recipients

## 2021-02-25 ENCOUNTER — LAB (OUTPATIENT)
Dept: LAB | Facility: CLINIC | Age: 61
End: 2021-02-25
Payer: COMMERCIAL

## 2021-02-25 DIAGNOSIS — K51.00 ULCERATIVE PANCOLITIS WITHOUT COMPLICATION (HCC): ICD-10-CM

## 2021-02-25 PROCEDURE — 83993 ASSAY FOR CALPROTECTIN FECAL: CPT

## 2021-03-03 LAB — CALPROTECTIN STL-MCNT: 268 UG/G (ref 0–120)

## 2021-03-12 ENCOUNTER — TELEPHONE (OUTPATIENT)
Dept: GASTROENTEROLOGY | Facility: CLINIC | Age: 61
End: 2021-03-12

## 2021-03-12 NOTE — TELEPHONE ENCOUNTER
Hi Dr Raymon Perez,     This patient stopped by the office today to see if you would be willing to complete disability paperwork for his employer; he states due to his Ulcerative Colitis he requires multiple bathroom breaks through out the day  Please advise if you can complete this for the patient          Thank you

## 2021-03-16 NOTE — TELEPHONE ENCOUNTER
Hi,    I can complete it for him  Anyway it can be sent to [facility] office and I can complete it on Thursday? Otherwise I can do it in [place] next week Wednesday       Thank you

## 2021-03-16 NOTE — TELEPHONE ENCOUNTER
Hi Dr Shaun Duncan,     Patient would like to know if you would be willing to complete disability paperwork for him, he requires multiple bathroom breaks through out the day  I was waiting to hear back from Dr Darío Aguilera, but I believe he is out of the office  Please advise, if you prefer not to, I will let the patient know I will call him with an answer when I hear back from Dr Darío Aguilera          Thank you

## 2021-03-16 NOTE — TELEPHONE ENCOUNTER
I will e-mail it to one of the MA's in [facility] tomorrow  I will let the patient know        Thank you

## 2021-04-08 DIAGNOSIS — Z23 ENCOUNTER FOR IMMUNIZATION: ICD-10-CM

## 2021-04-21 ENCOUNTER — OFFICE VISIT (OUTPATIENT)
Dept: GASTROENTEROLOGY | Facility: CLINIC | Age: 61
End: 2021-04-21
Payer: COMMERCIAL

## 2021-04-21 VITALS
WEIGHT: 176 LBS | SYSTOLIC BLOOD PRESSURE: 110 MMHG | DIASTOLIC BLOOD PRESSURE: 68 MMHG | TEMPERATURE: 96.7 F | HEIGHT: 66 IN | BODY MASS INDEX: 28.28 KG/M2

## 2021-04-21 DIAGNOSIS — K51.00 ULCERATIVE PANCOLITIS WITHOUT COMPLICATION (HCC): Primary | ICD-10-CM

## 2021-04-21 DIAGNOSIS — K58.8 OTHER IRRITABLE BOWEL SYNDROME: ICD-10-CM

## 2021-04-21 PROBLEM — K58.0 IRRITABLE BOWEL SYNDROME WITH DIARRHEA: Status: ACTIVE | Noted: 2021-04-21

## 2021-04-21 PROCEDURE — 99214 OFFICE O/P EST MOD 30 MIN: CPT | Performed by: INTERNAL MEDICINE

## 2021-04-21 PROCEDURE — 3008F BODY MASS INDEX DOCD: CPT | Performed by: INTERNAL MEDICINE

## 2021-04-21 PROCEDURE — 1036F TOBACCO NON-USER: CPT | Performed by: INTERNAL MEDICINE

## 2021-04-21 NOTE — PROGRESS NOTES
[facility] - Outpatient Follow-up Note  Raeann Mohs 62 y o  male MRN: [id number]  Encounter: [id number]          ASSESSMENT AND PLAN:      1  Ulcerative pancolitis without complication ([address] )  - mild  pancolonic ulcerative colitis diagnosed 2020  - last colonoscopy 09/2020  - symptoms stable on Lialda 4 8 g daily and would continue this  - repeat inflammatory markers in 3 months  - full colonoscopy 09/2021 to assess for endoscopic and possible histologic healing  - C-reactive protein; Future  - Calprotectin,Fecal; Future    2  Other irritable bowel syndrome  -  Continue Levsin as needed    RTC in 3 months  ______________________________________________________________________    SUBJECTIVE:   52-year-old male seen in follow-up for pancolonic ulcerative colitis diagnosed in 2020 mild in severity  He currently has at least 5 bowel movements which are nonbloody in nature and at times can be lose or formed  He has intermittent generalized abdominal pain for which he takes Levsin as needed  Denies nausea, vomiting, blood in stool  His appetite is stable and no changes in weight  His last  Inflammatory markers were performed 2/24/21 and his fecal calprotectin and CRP were mild to moderately elevated at 268 and 6 2 respectively  REVIEW OF SYSTEMS IS OTHERWISE NEGATIVE        Historical Information   Past Medical History:   Diagnosis Date    Hemorrhoids     Hyperlipidemia      Past Surgical History:   Procedure Laterality Date    COLONOSCOPY      NASAL SEPTUM SURGERY  1994    PALATE SURGERY  1994    tighten    UPPER GASTROINTESTINAL ENDOSCOPY      UVULECTOMY  1994     Social History   Social History     Substance and Sexual Activity   Alcohol Use Yes    Comment: socially     Social History     Substance and Sexual Activity   Drug Use Not on file     Social History     Tobacco Use   Smoking Status Never Smoker   Smokeless Tobacco Never Used     Family History   Problem Relation Age of Onset    No Known Problems Mother     Diabetes Father     Heart attack Father     Diabetes Other     Heart disease Family        Meds/Allergies       Current Outpatient Medications:     Multiple Vitamins-Minerals (MULTIVITAMIN WITH MINERALS) tablet    hyoscyamine (ANASPAZ,LEVSIN) 0 125 MG tablet    mesalamine (LIALDA) 1 2 g EC tablet    Probiotic Product (PROBIOTIC DAILY PO)    Allergies   Allergen Reactions    Penicillins Rash           Objective     Blood pressure 110/68, temperature (!) 96 7 °F (35 9 °C), temperature source Tympanic, height 5' 6" (1 676 m), weight 79 8 kg (176 lb)  Body mass index is 28 41 kg/m²  PHYSICAL EXAM:      General Appearance:   Alert, cooperative, no distress   HEENT:   Normocephalic, atraumatic, anicteric      Neck:  Supple, symmetrical, trachea midline   Lungs:   Clear to auscultation bilaterally; no rales, rhonchi or wheezing; respirations unlabored    Heart[de-identified]   Regular rate and rhythm; no murmur, rub, or gallop  Abdomen:   Soft, non-tender, non-distended; normal bowel sounds; no masses, no organomegaly    Genitalia:   Deferred    Rectal:   Deferred    Extremities:  No cyanosis, clubbing or edema        Skin:  No jaundice, rashes, or lesions    Lymph nodes:  No palpable cervical lymphadenopathy        Lab Results:   No visits with results within 1 Day(s) from this visit  Latest known visit with results is:   Lab on 02/25/2021   Component Date Value    Calprotectin 02/25/2021 268*         Radiology Results:   No results found

## 2021-04-24 ENCOUNTER — IMMUNIZATIONS (OUTPATIENT)
Dept: FAMILY MEDICINE CLINIC | Facility: HOSPITAL | Age: 61
End: 2021-04-24

## 2021-04-24 DIAGNOSIS — Z23 ENCOUNTER FOR IMMUNIZATION: Primary | ICD-10-CM

## 2021-04-24 PROCEDURE — 0001A SARS-COV-2 / COVID-19 MRNA VACCINE (PFIZER-BIONTECH) 30 MCG: CPT

## 2021-04-24 PROCEDURE — 91300 SARS-COV-2 / COVID-19 MRNA VACCINE (PFIZER-BIONTECH) 30 MCG: CPT

## 2021-05-06 ENCOUNTER — TELEPHONE (OUTPATIENT)
Dept: GASTROENTEROLOGY | Facility: CLINIC | Age: 61
End: 2021-05-06

## 2021-05-06 NOTE — TELEPHONE ENCOUNTER
Pt dropped off employment disability form needed to be completed  Provided forms to [employer]  Please fax to [phone] once completed & make pt aware upon completion

## 2021-05-07 ENCOUNTER — TELEPHONE (OUTPATIENT)
Dept: GASTROENTEROLOGY | Facility: CLINIC | Age: 61
End: 2021-05-07

## 2021-05-07 NOTE — TELEPHONE ENCOUNTER
I called and was able to speak with patient, forms dropped off are for short term disability [facility], this is to cover the time that the patient has been out of work (3/9/21-4/29/21)  I advised patient that I will discuss this with Dr Phi Muro and keep him posted  **when completed , needs to be faxed to number listed on form   Deadline 5/20/21

## 2021-05-15 ENCOUNTER — IMMUNIZATIONS (OUTPATIENT)
Dept: FAMILY MEDICINE CLINIC | Facility: HOSPITAL | Age: 61
End: 2021-05-15

## 2021-05-15 DIAGNOSIS — Z23 ENCOUNTER FOR IMMUNIZATION: Primary | ICD-10-CM

## 2021-05-15 PROCEDURE — 0002A SARS-COV-2 / COVID-19 MRNA VACCINE (PFIZER-BIONTECH) 30 MCG: CPT

## 2021-05-15 PROCEDURE — 91300 SARS-COV-2 / COVID-19 MRNA VACCINE (PFIZER-BIONTECH) 30 MCG: CPT

## 2021-06-13 DIAGNOSIS — K52.9 INFLAMMATORY BOWEL DISEASE: ICD-10-CM

## 2021-06-14 DIAGNOSIS — K52.9 INFLAMMATORY BOWEL DISEASE: ICD-10-CM

## 2021-06-14 RX ORDER — MESALAMINE 1.2 G/1
4800 TABLET, DELAYED RELEASE ORAL
Qty: 120 TABLET | Refills: 6 | Status: SHIPPED | OUTPATIENT
Start: 2021-06-14 | End: 2021-12-13

## 2021-06-14 NOTE — TELEPHONE ENCOUNTER
GI Physician: Dr Adrienne Lewis    Refill Request for Medication: Mesalamine    Dose: 4,800 mg    Quantity: 120    Pharmacy and Location: [facility]

## 2021-06-16 RX ORDER — MESALAMINE 1.2 G/1
TABLET, DELAYED RELEASE ORAL
Qty: 120 TABLET | Refills: 4 | Status: SHIPPED | OUTPATIENT
Start: 2021-06-16 | End: 2021-12-13

## 2021-07-19 ENCOUNTER — APPOINTMENT (OUTPATIENT)
Dept: LAB | Facility: CLINIC | Age: 61
End: 2021-07-19
Payer: COMMERCIAL

## 2021-07-19 DIAGNOSIS — K51.00 ULCERATIVE PANCOLITIS WITHOUT COMPLICATION (HCC): ICD-10-CM

## 2021-07-19 LAB — CRP SERPL QL: 13.8 MG/L

## 2021-07-19 PROCEDURE — 36415 COLL VENOUS BLD VENIPUNCTURE: CPT

## 2021-07-19 PROCEDURE — 86140 C-REACTIVE PROTEIN: CPT

## 2021-07-22 ENCOUNTER — APPOINTMENT (OUTPATIENT)
Dept: LAB | Facility: CLINIC | Age: 61
End: 2021-07-22
Payer: COMMERCIAL

## 2021-07-22 DIAGNOSIS — K51.00 ULCERATIVE PANCOLITIS WITHOUT COMPLICATION (HCC): ICD-10-CM

## 2021-07-22 PROCEDURE — 83993 ASSAY FOR CALPROTECTIN FECAL: CPT

## 2021-07-24 LAB — CALPROTECTIN STL-MCNT: 97 UG/G (ref 0–120)

## 2021-07-28 ENCOUNTER — OFFICE VISIT (OUTPATIENT)
Dept: GASTROENTEROLOGY | Facility: CLINIC | Age: 61
End: 2021-07-28
Payer: COMMERCIAL

## 2021-07-28 VITALS
HEIGHT: 66 IN | SYSTOLIC BLOOD PRESSURE: 110 MMHG | TEMPERATURE: 98.9 F | DIASTOLIC BLOOD PRESSURE: 69 MMHG | WEIGHT: 180 LBS | BODY MASS INDEX: 28.93 KG/M2 | HEART RATE: 76 BPM

## 2021-07-28 DIAGNOSIS — K51.00 ULCERATIVE PANCOLITIS WITHOUT COMPLICATION (HCC): Primary | ICD-10-CM

## 2021-07-28 PROCEDURE — 99214 OFFICE O/P EST MOD 30 MIN: CPT | Performed by: INTERNAL MEDICINE

## 2021-07-28 PROCEDURE — 1036F TOBACCO NON-USER: CPT | Performed by: INTERNAL MEDICINE

## 2021-07-28 PROCEDURE — 3008F BODY MASS INDEX DOCD: CPT | Performed by: INTERNAL MEDICINE

## 2021-07-28 NOTE — PROGRESS NOTES
[facility] - Outpatient Note  Sravan Barraza 62 y o  male MRN: [id number]  Encounter: [id number]      ASSESSMENT AND PLAN:    Sravan Barraza is a 62 y o  old male with PMH of ulcerative colitis who presents for followup    Ulcerative colitis - diagnosed in 2020 with moderate pancolitis biopsy showing acute and chronic colitis with glandular dropout in disarray consistent with IBD  Patient is currently on Lialda 4 8 g daily  Fortunately his fecal calprotectin is down trending  His symptoms have improved significantly  · Continue Lialda 4 8 g daily  Can consider decreasing the colonoscopy shows significant improvement  · Schedule colonoscopy  · Check CBC and CMP  · Dermatology referral for annual skin exams  · Counseled on vaccinations  It appears patient needs hepatitis-A and B vaccinations  · Counseled on avoiding NSAIDs  · Counseled on avoiding smoking        There are no diagnoses linked to this encounter   ______________________________________________________________________    SUBJECTIVE:  Sravan Barraza is a 62 y o  old male with PMH of ulcerative colitis who presents for followup  Patient denies abdominal pain, nausea, vomiting, constipation, diarrhea, fevers/chills  Patient reports 1 formed bowel movement a day  Nonbloody  Has no acute complaints  Does not smoke  Does not use NSAIDs  Had colonoscopy in 2020 as above  REVIEW OF SYSTEMS IS OTHERWISE NEGATIVE        Historical Information   Past Medical History:   Diagnosis Date    Hemorrhoids     Hyperlipidemia      Past Surgical History:   Procedure Laterality Date    COLONOSCOPY      NASAL SEPTUM SURGERY  1994    PALATE SURGERY  1994    tighten    UPPER GASTROINTESTINAL ENDOSCOPY      UVULECTOMY  1994     Social History   Social History     Substance and Sexual Activity   Alcohol Use Yes    Comment: socially     Social History     Substance and Sexual Activity   Drug Use Not on file     Social History     Tobacco Use   Smoking Status Never Smoker   Smokeless Tobacco Never Used     Family History   Problem Relation Age of Onset    No Known Problems Mother     Diabetes Father     Heart attack Father     Diabetes Other     Heart disease Family        Meds/Allergies       Current Outpatient Medications:     hyoscyamine (ANASPAZ,LEVSIN) 0 125 MG tablet    mesalamine (LIALDA) 1 2 g EC tablet    mesalamine (LIALDA) 1 2 g EC tablet    Multiple Vitamins-Minerals (MULTIVITAMIN WITH MINERALS) tablet    Probiotic Product (PROBIOTIC DAILY PO)    Allergies   Allergen Reactions    Penicillins Rash           Objective     There were no vitals taken for this visit  There is no height or weight on file to calculate BMI  PHYSICAL EXAM:      General Appearance:   Alert, cooperative, no distress   HEENT:   Normocephalic, atraumatic, anicteric  Neck:  Supple, symmetrical, trachea midline   Lungs:   Clear to auscultation bilaterally; no rales, rhonchi or wheezing; respirations unlabored    Heart[de-identified]   Regular rate and rhythm; no murmur, rub, or gallop  Abdomen:   Soft, non-tender, non-distended; normal bowel sounds; no masses, no organomegaly    Genitalia:   Deferred    Rectal:   Deferred    Extremities:  No cyanosis, clubbing or edema    Pulses:  2+ and symmetric    Skin:  No jaundice, rashes, or lesions    Lymph nodes:  No palpable cervical lymphadenopathy        Lab Results:   No visits with results within 1 Day(s) from this visit  Latest known visit with results is:   Appointment on 07/22/2021   Component Date Value    Calprotectin 07/22/2021 97          Radiology Results:   No results found

## 2021-07-28 NOTE — PATIENT INSTRUCTIONS
Jac scheduled for Monday, 10/18/21, at [facility] with Dr Isis Ramesh  Dulcolax/Miralax prep instructions provided to patient in office by Arianne

## 2021-10-08 ENCOUNTER — TELEPHONE (OUTPATIENT)
Dept: GASTROENTEROLOGY | Facility: MEDICAL CENTER | Age: 61
End: 2021-10-08

## 2021-10-15 ENCOUNTER — TELEPHONE (OUTPATIENT)
Dept: GASTROENTEROLOGY | Facility: MEDICAL CENTER | Age: 61
End: 2021-10-15

## 2021-10-18 ENCOUNTER — HOSPITAL ENCOUNTER (OUTPATIENT)
Dept: GASTROENTEROLOGY | Facility: MEDICAL CENTER | Age: 61
Setting detail: OUTPATIENT SURGERY
Discharge: HOME | End: 2021-10-18
Admitting: INTERNAL MEDICINE
Payer: COMMERCIAL

## 2021-10-18 ENCOUNTER — ANESTHESIA (OUTPATIENT)
Dept: GASTROENTEROLOGY | Facility: MEDICAL CENTER | Age: 61
End: 2021-10-18

## 2021-10-18 ENCOUNTER — ANESTHESIA EVENT (OUTPATIENT)
Dept: GASTROENTEROLOGY | Facility: MEDICAL CENTER | Age: 61
End: 2021-10-18

## 2021-10-18 VITALS
HEART RATE: 61 BPM | DIASTOLIC BLOOD PRESSURE: 73 MMHG | HEIGHT: 66 IN | SYSTOLIC BLOOD PRESSURE: 128 MMHG | OXYGEN SATURATION: 96 % | WEIGHT: 175 LBS | TEMPERATURE: 98.4 F | BODY MASS INDEX: 28.12 KG/M2 | RESPIRATION RATE: 20 BRPM

## 2021-10-18 DIAGNOSIS — K51.00 ULCERATIVE PANCOLITIS WITHOUT COMPLICATION (HCC): ICD-10-CM

## 2021-10-18 PROCEDURE — 88305 TISSUE EXAM BY PATHOLOGIST: CPT | Performed by: PATHOLOGY

## 2021-10-18 PROCEDURE — 45380 COLONOSCOPY AND BIOPSY: CPT | Performed by: INTERNAL MEDICINE

## 2021-10-18 RX ORDER — SODIUM CHLORIDE 9 MG/ML
125 INJECTION, SOLUTION INTRAVENOUS CONTINUOUS
Status: DISCONTINUED | OUTPATIENT
Start: 2021-10-18 | End: 2021-10-22 | Stop reason: HOSPADM

## 2021-10-18 RX ORDER — PROPOFOL 10 MG/ML
INJECTION, EMULSION INTRAVENOUS AS NEEDED
Status: DISCONTINUED | OUTPATIENT
Start: 2021-10-18 | End: 2021-10-18

## 2021-10-18 RX ORDER — ONDANSETRON 2 MG/ML
4 INJECTION INTRAMUSCULAR; INTRAVENOUS ONCE AS NEEDED
Status: DISCONTINUED | OUTPATIENT
Start: 2021-10-18 | End: 2021-10-22 | Stop reason: HOSPADM

## 2021-10-18 RX ADMIN — PROPOFOL 50 MG: 10 INJECTION, EMULSION INTRAVENOUS at 09:00

## 2021-10-18 RX ADMIN — PROPOFOL 150 MG: 10 INJECTION, EMULSION INTRAVENOUS at 08:59

## 2021-10-18 RX ADMIN — SODIUM CHLORIDE 125 ML/HR: 0.9 INJECTION, SOLUTION INTRAVENOUS at 08:39

## 2021-10-18 RX ADMIN — PROPOFOL 100 MG: 10 INJECTION, EMULSION INTRAVENOUS at 09:13

## 2021-10-25 ENCOUNTER — TELEPHONE (OUTPATIENT)
Dept: GASTROENTEROLOGY | Facility: MEDICAL CENTER | Age: 61
End: 2021-10-25

## 2021-12-13 DIAGNOSIS — K52.9 INFLAMMATORY BOWEL DISEASE: ICD-10-CM

## 2021-12-13 RX ORDER — MESALAMINE 1.2 G/1
4800 TABLET, DELAYED RELEASE ORAL
Qty: 120 TABLET | Refills: 6 | Status: SHIPPED | OUTPATIENT
Start: 2021-12-13 | End: 2022-07-11 | Stop reason: SDUPTHER

## 2022-01-03 ENCOUNTER — RA CDI HCC (OUTPATIENT)
Dept: OTHER | Facility: HOSPITAL | Age: 62
End: 2022-01-03

## 2022-01-03 NOTE — PROGRESS NOTES
Based on clinical documentation indicated in your record, it appears that the patient may have the following conditions not coded in :    K51 90 Ulcerative colitis      If this is correct, please document and assess at your next visit     [facility]  coding opportunities          Number of diagnosis code(s) already on the problem list added to FYI fla                     Patients insurance company: [employer] (Medicare Advantage and Commercial)             [facility]  coding opportunities          Number of diagnosis code(s) already on the problem list added to [facility] Profit fla               Number of suggestions used: 1         Patients insurance company: [employer] (Medicare Advantage and Commercial)     Visit status: Patient arrived for their scheduled appointment

## 2022-01-10 ENCOUNTER — OFFICE VISIT (OUTPATIENT)
Dept: INTERNAL MEDICINE CLINIC | Facility: CLINIC | Age: 62
End: 2022-01-10
Payer: COMMERCIAL

## 2022-01-10 ENCOUNTER — APPOINTMENT (OUTPATIENT)
Dept: LAB | Facility: CLINIC | Age: 62
End: 2022-01-10
Payer: COMMERCIAL

## 2022-01-10 VITALS
OXYGEN SATURATION: 98 % | HEART RATE: 68 BPM | HEIGHT: 66 IN | DIASTOLIC BLOOD PRESSURE: 70 MMHG | TEMPERATURE: 96.8 F | BODY MASS INDEX: 28.8 KG/M2 | SYSTOLIC BLOOD PRESSURE: 114 MMHG | WEIGHT: 179.2 LBS

## 2022-01-10 DIAGNOSIS — R73.02 GLUCOSE INTOLERANCE (IMPAIRED GLUCOSE TOLERANCE): ICD-10-CM

## 2022-01-10 DIAGNOSIS — Z11.4 SCREENING FOR HIV (HUMAN IMMUNODEFICIENCY VIRUS): ICD-10-CM

## 2022-01-10 DIAGNOSIS — R14.3 FLATULENCE: ICD-10-CM

## 2022-01-10 DIAGNOSIS — E78.5 HYPERLIPIDEMIA, UNSPECIFIED HYPERLIPIDEMIA TYPE: ICD-10-CM

## 2022-01-10 DIAGNOSIS — Z23 ENCOUNTER FOR IMMUNIZATION: ICD-10-CM

## 2022-01-10 DIAGNOSIS — Z13.0 SCREENING FOR DEFICIENCY ANEMIA: ICD-10-CM

## 2022-01-10 DIAGNOSIS — Z12.5 PROSTATE CANCER SCREENING: ICD-10-CM

## 2022-01-10 DIAGNOSIS — K51.00 ULCERATIVE PANCOLITIS WITHOUT COMPLICATION (HCC): ICD-10-CM

## 2022-01-10 DIAGNOSIS — Z00.00 HEALTH MAINTENANCE EXAMINATION: Primary | ICD-10-CM

## 2022-01-10 LAB
ALBUMIN SERPL BCP-MCNC: 3.5 G/DL (ref 3.5–5)
ALP SERPL-CCNC: 99 U/L (ref 46–116)
ALT SERPL W P-5'-P-CCNC: 31 U/L (ref 12–78)
ANION GAP SERPL CALCULATED.3IONS-SCNC: 2 MMOL/L (ref 4–13)
AST SERPL W P-5'-P-CCNC: 23 U/L (ref 5–45)
BASOPHILS # BLD AUTO: 0.04 THOUSANDS/ΜL (ref 0–0.1)
BASOPHILS NFR BLD AUTO: 1 % (ref 0–1)
BILIRUB SERPL-MCNC: 0.39 MG/DL (ref 0.2–1)
BUN SERPL-MCNC: 15 MG/DL (ref 5–25)
CALCIUM SERPL-MCNC: 9.1 MG/DL (ref 8.3–10.1)
CHLORIDE SERPL-SCNC: 108 MMOL/L (ref 100–108)
CHOLEST SERPL-MCNC: 176 MG/DL
CO2 SERPL-SCNC: 29 MMOL/L (ref 21–32)
CREAT SERPL-MCNC: 1.02 MG/DL (ref 0.6–1.3)
EOSINOPHIL # BLD AUTO: 0.28 THOUSAND/ΜL (ref 0–0.61)
EOSINOPHIL NFR BLD AUTO: 7 % (ref 0–6)
ERYTHROCYTE [DISTWIDTH] IN BLOOD BY AUTOMATED COUNT: 12.4 % (ref 11.6–15.1)
GFR SERPL CREATININE-BSD FRML MDRD: 79 ML/MIN/1.73SQ M
GLUCOSE P FAST SERPL-MCNC: 86 MG/DL (ref 65–99)
HCT VFR BLD AUTO: 42.9 % (ref 36.5–49.3)
HDLC SERPL-MCNC: 38 MG/DL
HGB BLD-MCNC: 13.6 G/DL (ref 12–17)
IMM GRANULOCYTES # BLD AUTO: 0.01 THOUSAND/UL (ref 0–0.2)
IMM GRANULOCYTES NFR BLD AUTO: 0 % (ref 0–2)
LDLC SERPL CALC-MCNC: 124 MG/DL (ref 0–100)
LYMPHOCYTES # BLD AUTO: 1.08 THOUSANDS/ΜL (ref 0.6–4.47)
LYMPHOCYTES NFR BLD AUTO: 26 % (ref 14–44)
MCH RBC QN AUTO: 29.1 PG (ref 26.8–34.3)
MCHC RBC AUTO-ENTMCNC: 31.7 G/DL (ref 31.4–37.4)
MCV RBC AUTO: 92 FL (ref 82–98)
MONOCYTES # BLD AUTO: 0.42 THOUSAND/ΜL (ref 0.17–1.22)
MONOCYTES NFR BLD AUTO: 10 % (ref 4–12)
NEUTROPHILS # BLD AUTO: 2.37 THOUSANDS/ΜL (ref 1.85–7.62)
NEUTS SEG NFR BLD AUTO: 56 % (ref 43–75)
NONHDLC SERPL-MCNC: 138 MG/DL
NRBC BLD AUTO-RTO: 0 /100 WBCS
PLATELET # BLD AUTO: 392 THOUSANDS/UL (ref 149–390)
PMV BLD AUTO: 9.4 FL (ref 8.9–12.7)
POTASSIUM SERPL-SCNC: 4.7 MMOL/L (ref 3.5–5.3)
PROT SERPL-MCNC: 7.4 G/DL (ref 6.4–8.2)
PSA SERPL-MCNC: 0.8 NG/ML (ref 0–4)
RBC # BLD AUTO: 4.68 MILLION/UL (ref 3.88–5.62)
SODIUM SERPL-SCNC: 139 MMOL/L (ref 136–145)
TRIGL SERPL-MCNC: 72 MG/DL
WBC # BLD AUTO: 4.2 THOUSAND/UL (ref 4.31–10.16)

## 2022-01-10 PROCEDURE — 1036F TOBACCO NON-USER: CPT | Performed by: INTERNAL MEDICINE

## 2022-01-10 PROCEDURE — 99396 PREV VISIT EST AGE 40-64: CPT | Performed by: INTERNAL MEDICINE

## 2022-01-10 PROCEDURE — 90471 IMMUNIZATION ADMIN: CPT | Performed by: INTERNAL MEDICINE

## 2022-01-10 PROCEDURE — 85025 COMPLETE CBC W/AUTO DIFF WBC: CPT

## 2022-01-10 PROCEDURE — 80053 COMPREHEN METABOLIC PANEL: CPT

## 2022-01-10 PROCEDURE — 90682 RIV4 VACC RECOMBINANT DNA IM: CPT | Performed by: INTERNAL MEDICINE

## 2022-01-10 PROCEDURE — 3725F SCREEN DEPRESSION PERFORMED: CPT | Performed by: INTERNAL MEDICINE

## 2022-01-10 PROCEDURE — G0103 PSA SCREENING: HCPCS

## 2022-01-10 PROCEDURE — 3008F BODY MASS INDEX DOCD: CPT | Performed by: INTERNAL MEDICINE

## 2022-01-10 PROCEDURE — 36415 COLL VENOUS BLD VENIPUNCTURE: CPT

## 2022-01-10 PROCEDURE — 80061 LIPID PANEL: CPT

## 2022-01-10 PROCEDURE — 87389 HIV-1 AG W/HIV-1&-2 AB AG IA: CPT

## 2022-01-10 NOTE — ASSESSMENT & PLAN NOTE
A lipid profile has been requested for follow-up evaluation of cholesterol for this patient will review the results with him when the results are available

## 2022-01-10 NOTE — ASSESSMENT & PLAN NOTE
Healthy lifestyle regular exercise reviewed with the patient currently he admits that he does not get much regular exercise  Recommend regular walking exercise at this time  Patient tries to maintain a healthy balance diet as well    He is current on colon cancer screening and a PSA has been requested to complement his normal rectal examination of prostate on today's exam

## 2022-01-10 NOTE — ASSESSMENT & PLAN NOTE
A comprehensive metabolic profile has been requested to follow-up patient's previous elevation in fasting blood sugar will review the results with him when they are available    Advised against consumption of excessive concentrated sugars in the diet

## 2022-01-10 NOTE — PROGRESS NOTES
Assessment/Plan:    Ulcerative colitis ([facility] )  Her last colonoscopy report reviewed this was performed last year of background activity noted on biopsy mild in nature  Patient reports no symptoms at the present time  He continues on Lialda 4 8 grams daily  Regular followups with GI services recommended  Glucose intolerance (impaired glucose tolerance)  A comprehensive metabolic profile has been requested to follow-up patient's previous elevation in fasting blood sugar will review the results with him when they are available  Advised against consumption of excessive concentrated sugars in the diet    Hyperlipemia  A lipid profile has been requested for follow-up evaluation of cholesterol for this patient will review the results with him when the results are available    Health maintenance examination  Healthy lifestyle regular exercise reviewed with the patient currently he admits that he does not get much regular exercise  Recommend regular walking exercise at this time  Patient tries to maintain a healthy balance diet as well  He is current on colon cancer screening and a PSA has been requested to complement his normal rectal examination of prostate on today's exam       Diagnoses and all orders for this visit:    Screening for HIV (human immunodeficiency virus)  -     HIV 1/2 Antigen/Antibody (4th Generation) w Reflex [facility]; Future    Encounter for immunization  -     influenza vaccine, quadrivalent, recombinant, PF, 0 5 mL, for patients 18 yr+ (FLUBLOK)    Ulcerative pancolitis without complication (HCC)    Glucose intolerance (impaired glucose tolerance)  -     Comprehensive metabolic panel; Future    Hyperlipidemia, unspecified hyperlipidemia type  -     Lipid panel; Future    Flatulence    Screening for deficiency anemia  -     CBC and differential; Future    Prostate cancer screening  -     PSA, Total Screen; Future        Subjective:      Patient ID: Emilia Lazo is a 62 y o  male      This pleasant 71-year-old gentleman presents today for an annual complete physical examination  He has been feeling well since his last visit with us  He has a diagnosis of ulcerative colitis but denies any recent flare-up of his symptoms  He continues to drive a truck for [employer] indicates that he had a busy holiday season  He has received the annual flu vaccine today and I have recommended that in 2 weeks he have a booster for his COVID-19 vaccine he has had preliminary shots 1  And 2  The following portions of the patient's history were reviewed and updated as appropriate:   He  has a past medical history of Hemorrhoids and Hyperlipidemia  He   Patient Active Problem List    Diagnosis Date Noted    Other irritable bowel syndrome 04/21/2021    Ulcerative colitis ([facility] ) 01/04/2021    Lactose intolerance 12/30/2019    Peyronie disease 12/30/2019    Flatulence 06/13/2019    Health maintenance examination 12/17/2018    Overweight (BMI 25 0-29 9) 12/11/2017    Incomplete right bundle branch block 12/11/2017    Hyperlipemia 12/05/2016    Glucose intolerance (impaired glucose tolerance) 12/05/2016     He  has a past surgical history that includes Nasal septum surgery (1994); Uvulectomy (1994); Palate surgery (1994); Colonoscopy; and Upper gastrointestinal endoscopy  His family history includes Diabetes in his father and other; Heart attack in his father; Heart disease in his family; No Known Problems in his mother  He  reports that he has never smoked  He has never used smokeless tobacco  He reports current alcohol use  No history on file for drug use  Current Outpatient Medications   Medication Sig Dispense Refill    mesalamine (LIALDA) 1 2 g EC tablet Take 4 tablets (4 8 g total) by mouth daily with breakfast 120 tablet 6    Multiple Vitamins-Minerals (MULTIVITAMIN WITH MINERALS) tablet Take 1 tablet by mouth daily       No current facility-administered medications for this visit          Review of Systems   All other systems reviewed and are negative  Objective:      /70   Pulse 68   Temp (!) 96 8 °F (36 °C)   Ht 5' 6" (1 676 m)   Wt 81 3 kg (179 lb 3 2 oz)   SpO2 98%   BMI 28 92 kg/m²          Physical Exam  Constitutional:       General: He is not in acute distress  Appearance: He is well-developed  He is not ill-appearing  HENT:      Head: Normocephalic  Right Ear: Hearing, tympanic membrane, ear canal and external ear normal       Left Ear: Hearing, tympanic membrane, ear canal and external ear normal       Nose: Nose normal       Mouth/Throat:      Mouth: Mucous membranes are moist       Pharynx: Oropharynx is clear  Eyes:      General:         Right eye: No discharge  Left eye: No discharge  Extraocular Movements: Extraocular movements intact  Conjunctiva/sclera: Conjunctivae normal       Pupils: Pupils are equal, round, and reactive to light  Neck:      Thyroid: No thyromegaly  Vascular: No carotid bruit  Cardiovascular:      Rate and Rhythm: Normal rate and regular rhythm  Heart sounds: Normal heart sounds, S1 normal and S2 normal  No murmur heard  Pulmonary:      Effort: Pulmonary effort is normal       Breath sounds: Normal breath sounds  No wheezing, rhonchi or rales  Abdominal:      General: Bowel sounds are normal  There is no distension  Palpations: Abdomen is soft  There is no mass  Tenderness: There is no abdominal tenderness  There is no guarding  Hernia: There is no hernia in the left inguinal area or right inguinal area  Genitourinary:     Penis: Normal        Testes: Normal       Epididymis:      Right: Normal       Left: Normal       Prostate: Normal       Rectum: Normal    Musculoskeletal:         General: No swelling or tenderness  Normal range of motion  Cervical back: Normal range of motion and neck supple  No rigidity or tenderness  Right lower leg: No edema        Left lower leg: No edema    Lymphadenopathy:      Cervical: No cervical adenopathy  Lower Body: No right inguinal adenopathy  No left inguinal adenopathy  Skin:     General: Skin is warm and dry  Coloration: Skin is not jaundiced or pale  Neurological:      Mental Status: He is alert and oriented to person, place, and time  Mental status is at baseline  Deep Tendon Reflexes: Reflexes are normal and symmetric  Reflexes normal    Psychiatric:         Mood and Affect: Mood normal          Behavior: Behavior normal          Thought Content:  Thought content normal          Judgment: Judgment normal

## 2022-01-11 LAB — HIV 1+2 AB+HIV1 P24 AG SERPL QL IA: NORMAL

## 2022-02-23 ENCOUNTER — OFFICE VISIT (OUTPATIENT)
Dept: GASTROENTEROLOGY | Facility: CLINIC | Age: 62
End: 2022-02-23
Payer: COMMERCIAL

## 2022-02-23 VITALS
HEIGHT: 66 IN | TEMPERATURE: 99.1 F | SYSTOLIC BLOOD PRESSURE: 105 MMHG | WEIGHT: 178.2 LBS | BODY MASS INDEX: 28.64 KG/M2 | DIASTOLIC BLOOD PRESSURE: 65 MMHG

## 2022-02-23 DIAGNOSIS — K51.00 ULCERATIVE PANCOLITIS WITHOUT COMPLICATION (HCC): Primary | ICD-10-CM

## 2022-02-23 PROCEDURE — 99214 OFFICE O/P EST MOD 30 MIN: CPT | Performed by: INTERNAL MEDICINE

## 2022-02-23 PROCEDURE — 3008F BODY MASS INDEX DOCD: CPT | Performed by: INTERNAL MEDICINE

## 2022-02-23 PROCEDURE — 1036F TOBACCO NON-USER: CPT | Performed by: INTERNAL MEDICINE

## 2022-02-23 NOTE — PROGRESS NOTES
[facility] - Outpatient Note  Alwin Cogan 62 y o  male MRN: [id number]  Encounter: [id number]      ASSESSMENT AND PLAN:    Alwin Cogan is a 62 y o  old male with PMH of ulcerative colitis who presents for followup     Ulcerative colitis - diagnosed in 2020 with moderate pancolitis biopsy showing acute and chronic colitis with glandular dropout in disarray consistent with IBD  repeat colonoscopy in 2021 showed moderate pancolitis with biopsies showing moderate active colitis and chronic inactive colitis  Patient is currently on Lialda 4 8 g daily  he is essentially asymptomatic with 2 bowel movements a day that are formed  · Continue Lialda 4 8 g daily  · Discussed with patient about starting biologic therapy however at this time will hold off given lack of symptoms and repeat fecal calprotectin  Can consider steroids in the future like budesonide verses starting biologics in the future especially if symptoms worsen  · Repeat colonoscopy in 1 year  · Counseled on avoiding NSAIDs  · Counseled on avoiding smoking  · Follow-up in 6 months      There are no diagnoses linked to this encounter   ______________________________________________________________________    SUBJECTIVE:  Alwin Cogan is a 62 y o  old male with PMH of ulcerative colitis who presents for followup  diagnosed in 2020 with moderate pancolitis biopsy showing acute and chronic colitis with glandular dropout in disarray consistent with IBD  repeat colonoscopy in 2021 showed moderate pancolitis with biopsies showing moderate active colitis and chronic inactive colitis  Patient is currently on Lialda 4 8 g daily  he is essentially asymptomatic with 2 bowel movements a day that are formed  Currently denies abdominal pain nausea vomiting  Is doing level  No rectal bleeding  No difficulty with vision or joint pain  REVIEW OF SYSTEMS IS OTHERWISE NEGATIVE        Historical Information   Past Medical History:   Diagnosis Date    Hemorrhoids     Hyperlipidemia      Past Surgical History:   Procedure Laterality Date    COLONOSCOPY      NASAL SEPTUM SURGERY  1994    PALATE SURGERY  1994    tighten    UPPER GASTROINTESTINAL ENDOSCOPY      UVULECTOMY  1994     Social History   Social History     Substance and Sexual Activity   Alcohol Use Yes    Comment: socially     Social History     Substance and Sexual Activity   Drug Use Not on file     Social History     Tobacco Use   Smoking Status Never Smoker   Smokeless Tobacco Never Used     Family History   Problem Relation Age of Onset    No Known Problems Mother     Diabetes Father     Heart attack Father     Diabetes Other     Heart disease Family        Meds/Allergies       Current Outpatient Medications:     mesalamine (LIALDA) 1 2 g EC tablet    Multiple Vitamins-Minerals (MULTIVITAMIN WITH MINERALS) tablet    Allergies   Allergen Reactions    Penicillins Rash           Objective     Blood pressure 105/65, temperature 99 1 °F (37 3 °C), temperature source Tympanic, height 5' 6" (1 676 m), weight 80 8 kg (178 lb 3 2 oz)  Body mass index is 28 76 kg/m²  PHYSICAL EXAM:      General Appearance:   Alert, cooperative, no distress   HEENT:   Normocephalic, atraumatic, anicteric  Neck:  Supple, symmetrical, trachea midline   Lungs:   Clear to auscultation bilaterally; no rales, rhonchi or wheezing; respirations unlabored    Heart[de-identified]   Regular rate and rhythm; no murmur, rub, or gallop  Abdomen:   Soft, non-tender, non-distended; normal bowel sounds; no masses, no organomegaly    Genitalia:   Deferred    Rectal:   Deferred    Extremities:  No cyanosis, clubbing or edema    Pulses:  2+ and symmetric    Skin:  No jaundice, rashes, or lesions    Lymph nodes:  No palpable cervical lymphadenopathy        Lab Results:   No visits with results within 1 Day(s) from this visit     Latest known visit with results is:   Appointment on 01/10/2022 Component Date Value    HIV-1/HIV-2 Ab 01/10/2022 Non-Reactive     Sodium 01/10/2022 139     Potassium 01/10/2022 4 7     Chloride 01/10/2022 108     CO2 01/10/2022 29     ANION GAP 01/10/2022 2*    BUN 01/10/2022 15     Creatinine 01/10/2022 1 02     Glucose, Fasting 01/10/2022 86     Calcium 01/10/2022 9 1     AST 01/10/2022 23     ALT 01/10/2022 31     Alkaline Phosphatase 01/10/2022 99     Total Protein 01/10/2022 7 4     Albumin 01/10/2022 3 5     Total Bilirubin 01/10/2022 0 39     eGFR 01/10/2022 79     WBC 01/10/2022 4 20*    RBC 01/10/2022 4 68     Hemoglobin 01/10/2022 13 6     Hematocrit 01/10/2022 42 9     MCV 01/10/2022 92     MCH 01/10/2022 29 1     MCHC 01/10/2022 31 7     RDW 01/10/2022 12 4     MPV 01/10/2022 9 4     Platelets 87/29/0483 392*    nRBC 01/10/2022 0     Neutrophils Relative 01/10/2022 56     Immat GRANS % 01/10/2022 0     Lymphocytes Relative 01/10/2022 26     Monocytes Relative 01/10/2022 10     Eosinophils Relative 01/10/2022 7*    Basophils Relative 01/10/2022 1     Neutrophils Absolute 01/10/2022 2 37     Immature Grans Absolute 01/10/2022 0 01     Lymphocytes Absolute 01/10/2022 1 08     Monocytes Absolute 01/10/2022 0 42     Eosinophils Absolute 01/10/2022 0 28     Basophils Absolute 01/10/2022 0 04     Cholesterol 01/10/2022 176     Triglycerides 01/10/2022 72     HDL, Direct 01/10/2022 38*    LDL Calculated 01/10/2022 124*    Non-HDL-Chol (CHOL-HDL) 01/10/2022 138     PSA 01/10/2022 0 8          Radiology Results:   No results found

## 2022-02-28 ENCOUNTER — APPOINTMENT (OUTPATIENT)
Dept: LAB | Facility: CLINIC | Age: 62
End: 2022-02-28
Payer: COMMERCIAL

## 2022-02-28 DIAGNOSIS — K51.00 ULCERATIVE PANCOLITIS WITHOUT COMPLICATION (HCC): ICD-10-CM

## 2022-02-28 PROCEDURE — 83993 ASSAY FOR CALPROTECTIN FECAL: CPT

## 2022-03-03 LAB — CALPROTECTIN STL-MCNT: 323 UG/G (ref 0–120)

## 2022-03-25 ENCOUNTER — OFFICE VISIT (OUTPATIENT)
Dept: OBGYN CLINIC | Facility: HOSPITAL | Age: 62
End: 2022-03-25
Payer: COMMERCIAL

## 2022-03-25 ENCOUNTER — HOSPITAL ENCOUNTER (OUTPATIENT)
Dept: RADIOLOGY | Facility: HOSPITAL | Age: 62
Discharge: HOME | End: 2022-03-25
Payer: COMMERCIAL

## 2022-03-25 VITALS
SYSTOLIC BLOOD PRESSURE: 105 MMHG | WEIGHT: 178 LBS | DIASTOLIC BLOOD PRESSURE: 71 MMHG | BODY MASS INDEX: 28.61 KG/M2 | HEIGHT: 66 IN | HEART RATE: 73 BPM

## 2022-03-25 DIAGNOSIS — M25.511 ACUTE PAIN OF RIGHT SHOULDER: ICD-10-CM

## 2022-03-25 DIAGNOSIS — M25.511 ACUTE PAIN OF RIGHT SHOULDER: Primary | ICD-10-CM

## 2022-03-25 DIAGNOSIS — M54.12 RADICULOPATHY, CERVICAL REGION: ICD-10-CM

## 2022-03-25 PROCEDURE — 3008F BODY MASS INDEX DOCD: CPT | Performed by: PHYSICIAN ASSISTANT

## 2022-03-25 PROCEDURE — 1036F TOBACCO NON-USER: CPT | Performed by: PHYSICIAN ASSISTANT

## 2022-03-25 PROCEDURE — 73030 X-RAY EXAM OF SHOULDER: CPT

## 2022-03-25 PROCEDURE — 99203 OFFICE O/P NEW LOW 30 MIN: CPT | Performed by: PHYSICIAN ASSISTANT

## 2022-03-25 RX ORDER — METHYLPREDNISOLONE 4 MG/1
TABLET ORAL
Qty: 21 TABLET | Refills: 0 | Status: SHIPPED | OUTPATIENT
Start: 2022-03-25

## 2022-03-25 NOTE — PROGRESS NOTES
Assessment:    Right atraumatic chronic shoulder pain, symptoms are somewhat similar to previous diagnosis or rotator cuff syndrome which improved in the past with formal outpatient physical therapy  Suspect a component of right cervical radiculopathy given his radiating symptoms/paresthesais       Plan: Will initiate physical therapy for of the shoulder and cervical spine in hopes to improve his symptoms  Rx Medrol dose pack for symptomatic improvement  Otherwise can use Tylenol PRN  Unable to take NSAIDs due to UC  Follow-up in 6 weeks for re-evaluation with Dr Stephanie Curtis           Problem List Items Addressed This Visit     None                   Subjective:     Patient ID:  Brandon Hurt is a 62 y o  male    HPI    71-year-old male presenting for evaluation of his right shoulder  According to the patient, he has a three month history of intermittent a right lateral shoulder pain that radiates to the elbow forearm region he describes the pain as sharp and sometimes dull and unsure exactly what brings on  He states whenever he internally or externally rotates his shoulder he gets pain usually localized to the posterior shoulder but occasionally it radiates down to his hand with associated numbness and tingling  There is no recent injury or trauma to the area  There is no fall  He has not done any formal treatments or NSAID medications for this as he has ulcerative colitis and has to avoid ibuprofen  He states the pain is not usually worse at nighttime  Of note, he does have a history of rotator cuff syndrome for which she has seen Dr Stephanie Curtis in the past   He thinks symptoms are somewhat similar but is not entirely sure  Does state at that time he did do physical therapy which significantly improved his symptoms          The following portions of the patient's history were reviewed and updated as appropriate: allergies, current medications, past family history, past medical history, past social history, past surgical history and problem list     Review of Systems     Objective:    Imaging:  Right shoulder x-rays demonstrate no acute fracture or dislocation  There is a small area of calcific tendonitis visible  Vitals:    03/25/22 1010   BP: 105/71   Pulse: 73           Physical Exam     Orthopedic Examination:  Right shoulder     Inspection:  There is no open wounds or erythema  No obvious deformity  Palpation:  The clavicle AC joint scapular border are all nontender to palpation  The proximal biceps is nontender to palpation    Range-of-motion:  170 degrees of forward flexion shoulder abduction, 90 degrees of internal and external rotation  Strength:  5/5 supraspinatus infraspinatus subscapularis    Sensation:  Intact axillary muscular cutaneous median radial ulnar nerve distribution    Special Tests:  Negative drop arm   Negative hook test   Negative empty can  Mild positive cross-body adduction test   Negative Bucoda's test   Negative Spurling's to the right    Negative speed's test

## 2022-04-08 ENCOUNTER — EVALUATION (OUTPATIENT)
Dept: PHYSICAL THERAPY | Age: 62
End: 2022-04-08
Payer: COMMERCIAL

## 2022-04-08 DIAGNOSIS — M25.511 ACUTE PAIN OF RIGHT SHOULDER: Primary | ICD-10-CM

## 2022-04-08 DIAGNOSIS — M54.12 RADICULOPATHY, CERVICAL REGION: ICD-10-CM

## 2022-04-08 PROCEDURE — 97161 PT EVAL LOW COMPLEX 20 MIN: CPT

## 2022-04-08 PROCEDURE — 97110 THERAPEUTIC EXERCISES: CPT

## 2022-04-08 NOTE — PROGRESS NOTES
PT Evaluation     Today's date: 2022  Patient name: Wanda Garcia  : 1960  MRN: [id number]  Referring provider: Carlos Allen  Dx:   Encounter Diagnosis     ICD-10-CM    1  Acute pain of right shoulder  M25 511 Ambulatory Referral to Physical Therapy   2  Radiculopathy, cervical region  M54 12 Ambulatory Referral to Physical Therapy                  Assessment  Assessment details: Pt is a 62 y o  male who presents to IE with chief c/o R arm pain that radiates from R shoulder into R hand  Signs and symptoms indicate probable diagnosis of cervical radiculopathy of the lower cervical spine  Symptoms are reproduced with Spurling's testing and ROM of cervical spine  N/T is abolished with repeated flexion of cervical spine  He has primary impairments of decreased cervical ROM, slightly decreased RUE strength, decreased flexibility of muscles surrounding C/S, and poor posture  Pt's impairments are limiting his ability to sleep, complete ADL's, and complete work duties  Pt was given exercises as part of an HEP and was able to complete exercises on IE with good effort and minimal VC's for proper form  Educated pt on proper completion of HEP, normal response to exercises, and dx of cervical radiculopathy  He verbalizes understanding of all education provided  All questions answered   Pt would benefit from skilled OP PT in order to improve upon listed impairments and return to PLOF   Impairments: abnormal or restricted ROM, impaired physical strength, lacks appropriate home exercise program, pain with function and poor posture   Understanding of Dx/Px/POC: good   Prognosis: good    Goals  Short term goals (4 weeks)  Pt will improve strength by 1/2 grade in order to perform ADL's   Pt will improve cervical ROM by at least 50%   Pt will reduce radicular symptoms by at least 50%   Pt will be I with phase I HEP    Long term goals  Pt will be independent with phase II HEP by d/c  Pt will return to PLOF and perform normal leisure activities with a 90% reduction in symptoms by d/c  Pt will be able to sleep without disturbances by symptoms by d/c  Pt will improve FOTO >/= expected by d/c        Plan  Planned therapy interventions: patient education, therapeutic exercise, graded exercise, functional ROM exercises, flexibility, home exercise program, manual therapy, activity modification, strengthening, stretching, joint mobilization, massage and therapeutic activities  Frequency: 2x week  Duration in visits: 8  Duration in weeks: 4  Treatment plan discussed with: patient        Subjective Evaluation    History of Present Illness  Mechanism of injury: Pt states pain started about a month ago  Sometimes pain comes on randomly  He has trouble sleeping  Pain will radiate down into the forearm and hand  Pain is usually worse at night and then also when he wakes up in the morning  He takes a hot shower in the morning which helps clear it up  Denies neck pain  Does report pain/numbness/tingling into the arm and hand  Mostly the outside of the forearm and palm of the hand  Denies difficulty grasping or holding onto objects  Pt states medication has helped a lot  He is able to sleep a lot better than he used to  He is still not 100%  Denies difficulty reaching overhead and lifting overhead  When he turns his head he feels a pull in the back of the shoulder     Pain  Current pain ratin  At best pain ratin  At worst pain ratin  Quality: sharp  Relieving factors: heat and medications      Diagnostic Tests  X-ray: normal  Treatments  Previous treatment: physical therapy  Current treatment: physical therapy  Patient Goals  Patient goals for therapy: decreased pain  Patient goal: Be 100%, decrease pain         Objective     Neurological Testing     Sensation   Cervical/Thoracic   Left   Intact: light touch    Right   Intact: light touch    Reflexes   Left   Biceps (C5/C6): normal (2+)  Brachioradialis (C6): normal (2+)  Triceps (C7): trace (1+)    Active Range of Motion   Cervical/Thoracic Spine       Cervical    Flexion: 60 degrees   Extension: 40 degrees      Left lateral flexion: 25 degrees      Right lateral flexion: 40 degrees      Left rotation: 65 degrees  Right rotation: 70 degrees         Left Shoulder   Flexion: 160 degrees   Abduction: 160 degrees   External rotation 90°: 70 degrees   Internal rotation 90°: 70 degrees     Right Shoulder   Flexion: 152 degrees   Abduction: 160 degrees     Joint Play     Hypomobile: C5, C6 and C7     Pain: C6 and C7     Strength/Myotome Testing   Cervical Spine     Left   Normal strength    Right Shoulder     Planes of Motion   Flexion: 4+   Abduction: 4   External rotation at 0°: 4+   Internal rotation at 0°: 4+     Right Elbow   Flexion: WFL  Extension: WFL    Right Wrist/Hand   Wrist extension: WFL  Wrist flexion: WFL  Thumb extension: 4-    Tests     Right Shoulder   Negative belly press, external rotation lag sign, Neer's and painful arc                Precautions: None of note      Manuals 4/8/22            Cervical P/A             Cervical stretching                                       Neuro Re-Ed                                                                                                        Ther Ex             Repeated cervical flexion x30             UT stretch 3x30s            Chin tuck 5"x10            Corner stretch 3x30s            Cervical SNAG 5"x10                          UBE             Scap retractions             UE nerve glides             TB rows/ext             TB IR/ER             Prone flex/abd/ext                                       Ther Activity                                       Gait Training                                       Modalities

## 2022-04-15 ENCOUNTER — OFFICE VISIT (OUTPATIENT)
Dept: PHYSICAL THERAPY | Age: 62
End: 2022-04-15
Payer: COMMERCIAL

## 2022-04-15 DIAGNOSIS — M54.12 RADICULOPATHY, CERVICAL REGION: ICD-10-CM

## 2022-04-15 DIAGNOSIS — M25.511 ACUTE PAIN OF RIGHT SHOULDER: Primary | ICD-10-CM

## 2022-04-15 PROCEDURE — 97110 THERAPEUTIC EXERCISES: CPT

## 2022-04-15 PROCEDURE — 97140 MANUAL THERAPY 1/> REGIONS: CPT

## 2022-04-15 NOTE — PROGRESS NOTES
Daily Note     Today's date: 4/15/2022  Patient name: Linsey Stroud  : 1960  MRN: [id number]  Referring provider: Janell Jeans, Reva Hires  Dx:   Encounter Diagnosis     ICD-10-CM    1  Acute pain of right shoulder  M25 511    2  Radiculopathy, cervical region  M54 12                   Subjective: Pt states R shoulder is feeling a little bit better  Objective: See treatment diary below      Assessment: Initiated POC as indicated below  Pt was able to tolerate shoulder strengthening exercises without an increase in symptoms  Required VC's for proper form for stretches for cervical musculature  Responded well to C/S P/A mobilizations  Reports slightly decreased N/T in RUE  Tolerated treatment well  Patient demonstrated fatigue post treatment, exhibited good technique with therapeutic exercises and would benefit from continued PT      Plan: Continue per plan of care  Progress treatment as tolerated         Precautions: None of note      Manuals 22 4/15           Cervical P/A  10'           Cervical stretching                                       Neuro Re-Ed                                                                                                        Ther Ex             Repeated cervical flexion x30             UT stretch 3x30s            Chin tuck 5"x10            Corner stretch 3x30s            Cervical SNAG 5"x10                          UBE  4' fwd 4' bkwd           Scap retractions  5"x20            UE nerve glides  np           TB rows/ext  3x10 rows- blue  Ext-green           TB IR/ER  3x10 green TB           Prone flex/abd/ext  3x10 0#           Standing I's, Y's, T's   3x10 ea 1#           Horz abd w/ TB  3x10 Green TB           Pulleys  5'           Ther Activity                                       Gait Training                                       Modalities

## 2022-04-21 ENCOUNTER — OFFICE VISIT (OUTPATIENT)
Dept: PHYSICAL THERAPY | Age: 62
End: 2022-04-21
Payer: COMMERCIAL

## 2022-04-21 DIAGNOSIS — M54.12 RADICULOPATHY, CERVICAL REGION: ICD-10-CM

## 2022-04-21 DIAGNOSIS — M25.511 ACUTE PAIN OF RIGHT SHOULDER: Primary | ICD-10-CM

## 2022-04-21 PROCEDURE — 97110 THERAPEUTIC EXERCISES: CPT

## 2022-04-21 NOTE — PROGRESS NOTES
Daily Note     Today's date: 2022  Patient name: Laurie Tracy  : 1960  MRN: [id number]  Referring provider: Stuart Cabrera  Dx:   Encounter Diagnosis     ICD-10-CM    1  Acute pain of right shoulder  M25 511    2  Radiculopathy, cervical region  M54 12                   Subjective: Pt states R shoulder/arm is "getting better"      Objective: See treatment diary below      Assessment: Pt was able to tolerate progression of shoulder strengthening exercises without difficulty  Slightly limited cervical flexibility and ROM  Tolerated treatment well  Patient demonstrated fatigue post treatment, exhibited good technique with therapeutic exercises and would benefit from continued PT      Plan: Continue per plan of care  Progress treatment as tolerated         Precautions: None of note      Manuals 4/8/22 4/15 4/21          Cervical P/A  10' NR          Cervical stretching                                       Neuro Re-Ed                                                                                                        Ther Ex             Repeated cervical flexion x30             UT stretch 3x30s  3x30"          Chin tuck 5"x10            Corner stretch 3x30s            Cervical SNAG 5"x10                          UBE  4' fwd 4' bkwd 4' fwd  4' bkwd          Scap retractions  5"x20            UE nerve glides  np           TB rows/ext  3x10 rows- blue  Ext-green 3x10 13# kesier  3x10  10# erin          TB IR/ER  3x10 green TB 3x10 3# erin          Prone flex/abd/ext  3x10 0# 3x10 1#          Standing I's, Y's, T's   3x10 ea 1# 3x10 1# ea           Horz abd w/ TB  3x10 Green TB 3x10 green TB          Pulleys  5' 5'           Ther Activity                                       Gait Training                                       Modalities

## 2022-04-26 ENCOUNTER — OFFICE VISIT (OUTPATIENT)
Dept: PHYSICAL THERAPY | Age: 62
End: 2022-04-26
Payer: COMMERCIAL

## 2022-04-26 DIAGNOSIS — M25.511 ACUTE PAIN OF RIGHT SHOULDER: Primary | ICD-10-CM

## 2022-04-26 DIAGNOSIS — M54.12 RADICULOPATHY, CERVICAL REGION: ICD-10-CM

## 2022-04-26 PROCEDURE — 97110 THERAPEUTIC EXERCISES: CPT

## 2022-04-26 NOTE — PROGRESS NOTES
Daily Note     Today's date: 2022  Patient name: Cornel Guajardo  : 1960  MRN: [id number]  Referring provider: Naomi Ferraro  Dx:   Encounter Diagnosis     ICD-10-CM    1  Acute pain of right shoulder  M25 511    2  Radiculopathy, cervical region  M54 12                   Subjective: Pt reports continued improvement in R shoulder symptoms  Objective: See treatment diary below      Assessment: Pt was able to tolerate progression of strengthening exercises with little difficulty  Continues to progress well in regards to shoulder symptoms and strength  Tolerated treatment well  Patient demonstrated fatigue post treatment, exhibited good technique with therapeutic exercises and would benefit from continued PT      Plan: Continue per plan of care  Progress treatment as tolerated         Precautions: None of note      Manuals 4/8/22 4/15 4/21 4/26         Cervical P/A  10' NR          Cervical stretching                                       Neuro Re-Ed                                                                                                        Ther Ex             Repeated cervical flexion x30             UT stretch 3x30s  3x30" 3x30"         Chin tuck 5"x10            Corner stretch 3x30s            Cervical SNAG 5"x10             LS stretch    3x30"         UBE  4' fwd 4' bkwd 4' fwd  4' bkwd 4' fwd  4' bkwd         Scap retractions  5"x20            UE nerve glides  np           TB rows/ext  3x10 rows- blue  Ext-green 3x10 13# kesier  3x10  10# erin 3x10 14 5#  3x10 12 5#         TB IR/ER  3x10 green TB 3x10 3# erin 3x10 3# erin         Prone flex/abd/ext  3x10 0# 3x10 1#          Standing I's, Y's, T's   3x10 ea 1# 3x10 1# ea  3x10 2# ea         Horz abd w/ TB  3x10 Green TB 3x10 green TB 3x10 green TB         Pulleys  5' 5'  5'         Ther Activity                                       Gait Training                                       Modalities

## 2022-04-28 ENCOUNTER — OFFICE VISIT (OUTPATIENT)
Dept: PHYSICAL THERAPY | Age: 62
End: 2022-04-28
Payer: COMMERCIAL

## 2022-04-28 DIAGNOSIS — M54.12 RADICULOPATHY, CERVICAL REGION: ICD-10-CM

## 2022-04-28 DIAGNOSIS — M25.511 ACUTE PAIN OF RIGHT SHOULDER: Primary | ICD-10-CM

## 2022-04-28 PROCEDURE — 97110 THERAPEUTIC EXERCISES: CPT

## 2022-04-28 NOTE — PROGRESS NOTES
Daily Note     Today's date: 2022  Patient name: Jaren Perales  : 1960  MRN: [id number]  Referring provider: Dagmar Rodrigues  Dx: No diagnosis found  Subjective: Pt offers no new complaints in regard to R shoulder  Objective: See treatment diary below      Assessment: Pt was able to tolerate added shoulder stability/strengthening exercises but is challenged by them as he fatigues following completion  Pt reports following session that his R shoulder is doing very well and he does not feel he needs to continue OP PT  Discussed with pt and he has met all goals at this time  Will d/c to HEP  Tolerated treatment well  Patient demonstrated fatigue post treatment, exhibited good technique with therapeutic exercises and would benefit from continued PT    Goals  Short term goals (4 weeks) - MET  Pt will improve strength by 1/2 grade in order to perform ADL's - MET  Pt will improve cervical ROM by at least 50% - MET  Pt will reduce radicular symptoms by at least 50% - MET  Pt will be I with phase I HEP - MET    Long term goals  Pt will be independent with phase II HEP by d/c - MET  Pt will return to PLOF and perform normal leisure activities with a 90% reduction in symptoms by d/c - MET  Pt will be able to sleep without disturbances by symptoms by d/c - MET  Pt will improve FOTO >/= expected by d/c - MET    Plan: Continue per plan of care  Progress treatment as tolerated         Precautions: None of note      Manuals 4/8/22 4/15 4/21 4/26 4/28        Cervical P/A  10' NR          Cervical stretching                                       Neuro Re-Ed                                                                                                        Ther Ex             Repeated cervical flexion x30             UT stretch 3x30s  3x30" 3x30" 3x30"        Chin tuck 5"x10            Corner stretch 3x30s    5x30"        Cervical SNAG 5"x10             LS stretch    3x30" 3x30"        UBE 4' fwd 4' bkwd 4' fwd  4' bkwd 4' fwd  4' bkwd 4'fwd  4' bkwd        Scap retractions  5"x20            UE nerve glides  np           TB rows/ext  3x10 rows- blue  Ext-green 3x10 13# kesier  3x10  10# erin 3x10 14 5#  3x10 12 5# 3x10 15#  3x10 10#        TB IR/ER  3x10 green TB 3x10 3# erin 3x10 3# erin 3x10 3#        Prone flex/abd/ext  3x10 0# 3x10 1#  3x10 1#        Standing I's, Y's, T's   3x10 ea 1# 3x10 1# ea  3x10 2# ea 3x10 2#        Horz abd w/ TB  3x10 Green TB 3x10 green TB 3x10 green TB 3x10 green TB        Pulleys  5' 5'  5' 5'        PNF     3x10 5#, 2# erin        Alphabet on wall w/ med ball     x2                                  Ther Activity                                       Gait Training                                       Modalities

## 2022-07-11 DIAGNOSIS — K52.9 INFLAMMATORY BOWEL DISEASE: ICD-10-CM

## 2022-07-12 RX ORDER — MESALAMINE 1.2 G/1
4800 TABLET, DELAYED RELEASE ORAL
Qty: 120 TABLET | Refills: 6 | Status: SHIPPED | OUTPATIENT
Start: 2022-07-12 | End: 2022-08-11

## 2022-08-02 ENCOUNTER — TELEPHONE (OUTPATIENT)
Dept: INTERNAL MEDICINE CLINIC | Facility: CLINIC | Age: 62
End: 2022-08-02

## 2022-08-02 NOTE — TELEPHONE ENCOUNTER
Deanne Blakely called and said when he say you in January for his physical he had an infection of his finger and you told him if it happened again to call for an antibiotic  He now has 2 fingers that are infected  Please advise  This was a message from [employer]        [phone]

## 2022-08-04 DIAGNOSIS — L03.019 CELLULITIS OF FINGER, UNSPECIFIED LATERALITY: Primary | ICD-10-CM

## 2022-08-04 RX ORDER — CIPROFLOXACIN 500 MG/1
500 TABLET, FILM COATED ORAL EVERY 12 HOURS SCHEDULED
Qty: 20 TABLET | Refills: 0 | Status: SHIPPED | OUTPATIENT
Start: 2022-08-04 | End: 2022-08-14

## 2022-08-04 NOTE — TELEPHONE ENCOUNTER
Patient called and said he missed your call and he goes to [employer] in [facility]  He can be reached at [phone]    Thank you

## 2022-09-12 ENCOUNTER — TELEPHONE (OUTPATIENT)
Dept: GASTROENTEROLOGY | Facility: CLINIC | Age: 62
End: 2022-09-12

## 2022-09-14 ENCOUNTER — OFFICE VISIT (OUTPATIENT)
Dept: GASTROENTEROLOGY | Facility: CLINIC | Age: 62
End: 2022-09-14

## 2022-09-14 VITALS
HEIGHT: 66 IN | BODY MASS INDEX: 28.45 KG/M2 | TEMPERATURE: 98.9 F | DIASTOLIC BLOOD PRESSURE: 68 MMHG | WEIGHT: 177 LBS | SYSTOLIC BLOOD PRESSURE: 110 MMHG

## 2022-09-14 DIAGNOSIS — K51.00 ULCERATIVE PANCOLITIS WITHOUT COMPLICATION (HCC): Primary | ICD-10-CM

## 2022-09-14 PROCEDURE — 99214 OFFICE O/P EST MOD 30 MIN: CPT | Performed by: INTERNAL MEDICINE

## 2022-09-14 NOTE — PROGRESS NOTES
[facility] - Outpatient Note  Abbe Baird 65 y o  male MRN: [id number]  Encounter: [id number]      ASSESSMENT AND PLAN:    Kiara Lake a 62 y o  old male with PMH of ulcerative colitis who presents for followup     Ulcerative colitis - diagnosed in 2020 with moderate pancolitis biopsy showing acute and chronic colitis with glandular dropout in disarray consistent with IBD    repeat colonoscopy in 2021 showed moderate pancolitis with biopsies showing moderate active colitis and chronic inactive colitis  Patient is currently on Lialda 4 8 g daily    he is essentially asymptomatic with 2 bowel movements a day that are formed  · Plan for repeat colonoscopy  Patient open to starting biologic if persistent inflammation on colonoscopy  · Check fecal calprotectin  · Continue Lialda 4 8 g daily  · Consider steroids in the future  · Counseled on avoiding NSAIDs  · Counseled on avoiding smoking  · Follow-up in 6 months         There are no diagnoses linked to this encounter   ______________________________________________________________________    SUBJECTIVE:  Kiara Lake a 62 y o  old male with PMH of ulcerative colitis who presents for follow up  Pt was diagnosed in 2020 with moderate pancolitis biopsy showing acute and chronic colitis with glandular dropout in disarray consistent with IBD    repeat colonoscopy in 2021 showed moderate pancolitis with biopsies showing moderate active colitis and chronic inactive colitis  Patient is currently on Lialda 4 8 g daily     patient currently reports mostly formed bowel movements however does have intermittent loose stools past 6 months  No blood  He also reports mild central abdominal discomfort occurring intermittently throughout the past 6 months which is new  Otherwise he is doing well overall  No nausea vomiting or weight loss  Does not take NSAIDs  REVIEW OF SYSTEMS IS OTHERWISE NEGATIVE        Historical Information   Past Medical History:   Diagnosis Date    Hemorrhoids     Hyperlipidemia      Past Surgical History:   Procedure Laterality Date    COLONOSCOPY      NASAL SEPTUM SURGERY  1994    PALATE SURGERY  1994    tighten    UPPER GASTROINTESTINAL ENDOSCOPY      UVULECTOMY  1994     Social History   Social History     Substance and Sexual Activity   Alcohol Use Yes    Comment: socially     Social History     Substance and Sexual Activity   Drug Use Not on file     Social History     Tobacco Use   Smoking Status Never Smoker   Smokeless Tobacco Never Used     Family History   Problem Relation Age of Onset    No Known Problems Mother     Diabetes Father     Heart attack Father     Diabetes Other     Heart disease Family        Meds/Allergies       Current Outpatient Medications:     Multiple Vitamins-Minerals (MULTIVITAMIN WITH MINERALS) tablet    mesalamine (LIALDA) 1 2 g EC tablet    methylPREDNISolone 4 MG tablet therapy pack    Allergies   Allergen Reactions    Penicillins Rash           Objective     There were no vitals taken for this visit  There is no height or weight on file to calculate BMI  PHYSICAL EXAM:      General Appearance:   Alert, cooperative, no distress   HEENT:   Normocephalic, atraumatic, anicteric  Neck:  Supple, symmetrical, trachea midline   Lungs:   Clear to auscultation bilaterally; no rales, rhonchi or wheezing; respirations unlabored    Heart[de-identified]   Regular rate and rhythm; no murmur, rub, or gallop  Abdomen:   Soft, non-tender, non-distended; normal bowel sounds; no masses, no organomegaly    Genitalia:   Deferred    Rectal:   Deferred    Extremities:  No cyanosis, clubbing or edema    Pulses:  2+ and symmetric    Skin:  No jaundice, rashes, or lesions    Lymph nodes:  No palpable cervical lymphadenopathy        Lab Results:   No visits with results within 1 Day(s) from this visit     Latest known visit with results is:   Appointment on 02/28/2022   Component Date Value    Calprotectin 02/28/2022 323 (A)         Radiology Results:   No results found

## 2022-09-19 ENCOUNTER — APPOINTMENT (OUTPATIENT)
Dept: LAB | Facility: CLINIC | Age: 62
End: 2022-09-19

## 2022-09-19 DIAGNOSIS — K51.00 ULCERATIVE PANCOLITIS WITHOUT COMPLICATION (HCC): ICD-10-CM

## 2022-09-19 PROCEDURE — 83993 ASSAY FOR CALPROTECTIN FECAL: CPT

## 2022-09-23 LAB — CALPROTECTIN STL-MCNT: 271 UG/G (ref 0–120)

## 2022-11-09 ENCOUNTER — TELEPHONE (OUTPATIENT)
Dept: INTERNAL MEDICINE CLINIC | Facility: CLINIC | Age: 62
End: 2022-11-09

## 2022-11-09 ENCOUNTER — NEW PATIENT (OUTPATIENT)
Dept: URBAN - METROPOLITAN AREA CLINIC 6 | Facility: CLINIC | Age: 62
End: 2022-11-09

## 2022-11-09 DIAGNOSIS — L03.019 CELLULITIS OF FINGER, UNSPECIFIED LATERALITY: Primary | ICD-10-CM

## 2022-11-09 DIAGNOSIS — Z01.00: ICD-10-CM

## 2022-11-09 PROCEDURE — 92004 COMPRE OPH EXAM NEW PT 1/>: CPT

## 2022-11-09 PROCEDURE — 92015 DETERMINE REFRACTIVE STATE: CPT

## 2022-11-09 RX ORDER — CIPROFLOXACIN 500 MG/1
500 TABLET, FILM COATED ORAL EVERY 12 HOURS SCHEDULED
Qty: 20 TABLET | Refills: 0 | Status: SHIPPED | OUTPATIENT
Start: 2022-11-09 | End: 2022-11-19

## 2022-11-09 ASSESSMENT — VISUAL ACUITY
OD_CC: 20/50-2
OS_CC: 20/70+1

## 2022-11-09 ASSESSMENT — TONOMETRY
OD_IOP_MMHG: 15
OS_IOP_MMHG: 15

## 2022-11-09 NOTE — TELEPHONE ENCOUNTER
Lakshmi Vega called and said his finger seems infected again  He is asking If you can prescribe an antibiotic  It is red and hurts when you touch it  It is the 4th finger -close to pinky on the right hand  There was no injury, not sure how it started      [phone]

## 2022-11-09 NOTE — TELEPHONE ENCOUNTER
Call was returned the patient did not answer I have left a message at his voicemail indicating that I have sent a prescription in to his pharmacy for Cipro 500 mg twice a day for 10 days

## 2022-11-09 NOTE — PROGRESS NOTES
Patient called indicating he has cellulitis and a finger again an antibiotic of Cipro 500 mg twice a day has been prescribed follow-up in the office if symptoms do not resolve

## 2022-11-15 ENCOUNTER — DIAGNOSTICS ONLY (OUTPATIENT)
Dept: URBAN - METROPOLITAN AREA CLINIC 6 | Facility: CLINIC | Age: 62
End: 2022-11-15

## 2022-11-15 DIAGNOSIS — H25.813: ICD-10-CM

## 2022-11-15 PROCEDURE — MISCESTERMANVF

## 2023-01-09 ENCOUNTER — RA CDI HCC (OUTPATIENT)
Dept: OTHER | Facility: HOSPITAL | Age: 63
End: 2023-01-09

## 2023-01-09 NOTE — PROGRESS NOTES
[facility]  coding opportunities       Chart reviewed, no opportunity found: CHART REVIEWED, NO OPPORTUNITY FOUND        Patients Insurance        Commercial Insurance: [address] [place] [address]

## 2023-01-16 ENCOUNTER — OFFICE VISIT (OUTPATIENT)
Dept: INTERNAL MEDICINE CLINIC | Facility: CLINIC | Age: 63
End: 2023-01-16

## 2023-01-16 VITALS
TEMPERATURE: 97 F | WEIGHT: 177.4 LBS | OXYGEN SATURATION: 98 % | SYSTOLIC BLOOD PRESSURE: 120 MMHG | HEIGHT: 66 IN | DIASTOLIC BLOOD PRESSURE: 66 MMHG | HEART RATE: 85 BPM | BODY MASS INDEX: 28.51 KG/M2

## 2023-01-16 DIAGNOSIS — Z12.5 PROSTATE CANCER SCREENING: ICD-10-CM

## 2023-01-16 DIAGNOSIS — Z13.0 SCREENING FOR DEFICIENCY ANEMIA: ICD-10-CM

## 2023-01-16 DIAGNOSIS — E78.5 HYPERLIPIDEMIA, UNSPECIFIED HYPERLIPIDEMIA TYPE: ICD-10-CM

## 2023-01-16 DIAGNOSIS — R73.02 GLUCOSE INTOLERANCE (IMPAIRED GLUCOSE TOLERANCE): ICD-10-CM

## 2023-01-16 DIAGNOSIS — Z00.00 HEALTH MAINTENANCE EXAMINATION: Primary | ICD-10-CM

## 2023-01-16 DIAGNOSIS — K51.00 ULCERATIVE PANCOLITIS WITHOUT COMPLICATION (HCC): ICD-10-CM

## 2023-01-16 PROBLEM — R14.3 FLATULENCE: Status: RESOLVED | Noted: 2019-06-13 | Resolved: 2023-01-16

## 2023-01-16 NOTE — ASSESSMENT & PLAN NOTE
He is mild elevation in fasting blood sugar is noted    Patient has a request for an updated comprehensive metabolic profile

## 2023-01-16 NOTE — PROGRESS NOTES
Name: Moi Burgess      : 1960      MRN: [id number]  Encounter Provider: Estee Gonzalez MD  Encounter Date: 2023   Encounter department: [facility]     1  Glucose intolerance (impaired glucose tolerance)  Assessment & Plan:  He is mild elevation in fasting blood sugar is noted  Patient has a request for an updated comprehensive metabolic profile    Orders:  -     Comprehensive metabolic panel; Future    2  Hyperlipidemia, unspecified hyperlipidemia type  Assessment & Plan:  Reviewed last year's lipid profile with the patient it does show mild elevation in his LDL  An updated lipid profile has been requested and will be reviewed with the patient upon completion of the blood work  He was provided with patient education material regarding dietary adjustments to address mild lipid elevation  Orders:  -     Lipid panel; Future    3  Prostate cancer screening  -     PSA, Total Screen; Future    4  Screening for deficiency anemia  -     CBC and differential; Future    5  Ulcerative pancol[facility] without complication ([facility] )  Assessment & Plan:  The patient's ulcerative colitis remains quite stable to present time he averages 1 bowel movement a day which is brown and formed  No abdominal pain or cramping is noted he continues on Lialda management for his ulcerative colitis  BMI Counseling: Body mass index is 28 63 kg/m²  The BMI is above normal  Nutrition recommendations include decreasing portion sizes, moderation in carbohydrate intake and reducing intake of cholesterol  Exercise recommendations include exercising 3-5 times per week  No pharmacotherapy was ordered  Rationale for BMI follow-up plan is due to patient being overweight or obese  Subjective      This 81-year-old gentleman presents today for an annual complete physical examination    To accompany today's exam we have recommended a set of blood work which was provided to the patient today we will review the results of the blood work when results are in  Patient indicates that he has retired from his primary job and is now working part-time for a [employer] firm  He has no complaints on today's visit he indicates that his ulcerative colitis has remained quite stable indicating that he has 1 bowel movement daily the stool generally is brown and formed  He denies any cardiac symptoms of chest pain palpitations or shortness of breath and has had no recent signs or symptoms of infection  Review of Systems   All other systems reviewed and are negative  Current Outpatient Medications on File Prior to Visit   Medication Sig   • Multiple Vitamins-Minerals (MULTIVITAMIN WITH MINERALS) tablet Take 1 tablet by mouth daily   • polyethylene glycol (GOLYTELY) 4000 mL solution PLEASE FOLLOW INSTRUCTIONS PROVIDED BY THE OFFICE   • mesalamine (LIALDA) 1 2 g EC tablet Take 4 tablets (4 8 g total) by mouth daily with breakfast   • [DISCONTINUED] methylPREDNISolone 4 MG tablet therapy pack 24mg PO on day 1, then decrease by 4mg/day x 5 days per dose pack instructions  (Patient not taking: No sig reported)       Objective     /66   Pulse 85   Temp (!) 97 °F (36 1 °C)   Ht 5' 6" (1 676 m)   Wt 80 5 kg (177 lb 6 4 oz)   SpO2 98%   BMI 28 63 kg/m²     Physical Exam  Constitutional:       General: He is not in acute distress  Appearance: He is well-developed  He is not ill-appearing  HENT:      Right Ear: Hearing, tympanic membrane, ear canal and external ear normal       Left Ear: Hearing, tympanic membrane, ear canal and external ear normal       Nose: Nose normal    Eyes:      Conjunctiva/sclera: Conjunctivae normal       Pupils: Pupils are equal, round, and reactive to light  Neck:      Thyroid: No thyromegaly  Cardiovascular:      Rate and Rhythm: Normal rate and regular rhythm  Heart sounds: Normal heart sounds, S1 normal and S2 normal  No murmur heard    Pulmonary: Effort: Pulmonary effort is normal       Breath sounds: Normal breath sounds  No wheezing  Abdominal:      General: Bowel sounds are normal  There is no distension  Palpations: Abdomen is soft  There is no mass  Tenderness: There is no abdominal tenderness  There is no guarding  Hernia: There is no hernia in the left inguinal area or right inguinal area  Genitourinary:     Penis: Normal        Testes: Normal       Epididymis:      Right: Normal       Left: Normal       Prostate: Normal       Rectum: Normal    Musculoskeletal:         General: No swelling or tenderness  Normal range of motion  Right lower leg: No edema  Left lower leg: No edema  Lymphadenopathy:      Cervical: No cervical adenopathy  Lower Body: No right inguinal adenopathy  No left inguinal adenopathy  Skin:     General: Skin is warm and dry  Coloration: Skin is not jaundiced or pale  Neurological:      Mental Status: He is alert and oriented to person, place, and time  Mental status is at baseline  Deep Tendon Reflexes: Reflexes are normal and symmetric  Reflexes normal    Psychiatric:         Mood and Affect: Mood normal          Behavior: Behavior normal          Thought Content:  Thought content normal          Judgment: Judgment normal        Jordan Granados MD

## 2023-01-16 NOTE — ASSESSMENT & PLAN NOTE
The patient's physical examination today appears to be in good range recommend regular exercise and low-cholesterol diet  Blood work has been requested to complement today's exam and will be reviewed with the patient upon completion    1 year annual physical is recommended

## 2023-01-16 NOTE — ASSESSMENT & PLAN NOTE
The patient's ulcerative colitis remains quite stable to present time he averages 1 bowel movement a day which is brown and formed  No abdominal pain or cramping is noted he continues on Lialda management for his ulcerative colitis

## 2023-01-16 NOTE — ASSESSMENT & PLAN NOTE
Reviewed last year's lipid profile with the patient it does show mild elevation in his LDL  An updated lipid profile has been requested and will be reviewed with the patient upon completion of the blood work  He was provided with patient education material regarding dietary adjustments to address mild lipid elevation

## 2023-01-30 ENCOUNTER — APPOINTMENT (OUTPATIENT)
Dept: LAB | Facility: CLINIC | Age: 63
End: 2023-01-30

## 2023-01-30 DIAGNOSIS — E78.5 HYPERLIPIDEMIA, UNSPECIFIED HYPERLIPIDEMIA TYPE: ICD-10-CM

## 2023-01-30 DIAGNOSIS — Z13.0 SCREENING FOR DEFICIENCY ANEMIA: ICD-10-CM

## 2023-01-30 DIAGNOSIS — Z12.5 PROSTATE CANCER SCREENING: ICD-10-CM

## 2023-01-30 DIAGNOSIS — R73.02 GLUCOSE INTOLERANCE (IMPAIRED GLUCOSE TOLERANCE): ICD-10-CM

## 2023-01-30 LAB
ALBUMIN SERPL BCP-MCNC: 3.6 G/DL (ref 3.5–5)
ALP SERPL-CCNC: 112 U/L (ref 46–116)
ALT SERPL W P-5'-P-CCNC: 28 U/L (ref 12–78)
ANION GAP SERPL CALCULATED.3IONS-SCNC: 3 MMOL/L (ref 4–13)
AST SERPL W P-5'-P-CCNC: 24 U/L (ref 5–45)
BASOPHILS # BLD AUTO: 0.06 THOUSANDS/ÂΜL (ref 0–0.1)
BASOPHILS NFR BLD AUTO: 2 % (ref 0–1)
BILIRUB SERPL-MCNC: 0.55 MG/DL (ref 0.2–1)
BUN SERPL-MCNC: 15 MG/DL (ref 5–25)
CALCIUM SERPL-MCNC: 9.3 MG/DL (ref 8.3–10.1)
CHLORIDE SERPL-SCNC: 106 MMOL/L (ref 96–108)
CHOLEST SERPL-MCNC: 208 MG/DL
CO2 SERPL-SCNC: 30 MMOL/L (ref 21–32)
CREAT SERPL-MCNC: 1 MG/DL (ref 0.6–1.3)
EOSINOPHIL # BLD AUTO: 0.2 THOUSAND/ÂΜL (ref 0–0.61)
EOSINOPHIL NFR BLD AUTO: 5 % (ref 0–6)
ERYTHROCYTE [DISTWIDTH] IN BLOOD BY AUTOMATED COUNT: 12 % (ref 11.6–15.1)
GFR SERPL CREATININE-BSD FRML MDRD: 80 ML/MIN/1.73SQ M
GLUCOSE P FAST SERPL-MCNC: 90 MG/DL (ref 65–99)
HCT VFR BLD AUTO: 45.8 % (ref 36.5–49.3)
HDLC SERPL-MCNC: 44 MG/DL
HGB BLD-MCNC: 14.8 G/DL (ref 12–17)
IMM GRANULOCYTES # BLD AUTO: 0.01 THOUSAND/UL (ref 0–0.2)
IMM GRANULOCYTES NFR BLD AUTO: 0 % (ref 0–2)
LDLC SERPL CALC-MCNC: 149 MG/DL (ref 0–100)
LYMPHOCYTES # BLD AUTO: 1.18 THOUSANDS/ÂΜL (ref 0.6–4.47)
LYMPHOCYTES NFR BLD AUTO: 30 % (ref 14–44)
MCH RBC QN AUTO: 28.9 PG (ref 26.8–34.3)
MCHC RBC AUTO-ENTMCNC: 32.3 G/DL (ref 31.4–37.4)
MCV RBC AUTO: 90 FL (ref 82–98)
MONOCYTES # BLD AUTO: 0.42 THOUSAND/ÂΜL (ref 0.17–1.22)
MONOCYTES NFR BLD AUTO: 11 % (ref 4–12)
NEUTROPHILS # BLD AUTO: 2.09 THOUSANDS/ÂΜL (ref 1.85–7.62)
NEUTS SEG NFR BLD AUTO: 52 % (ref 43–75)
NONHDLC SERPL-MCNC: 164 MG/DL
NRBC BLD AUTO-RTO: 0 /100 WBCS
PLATELET # BLD AUTO: 363 THOUSANDS/UL (ref 149–390)
PMV BLD AUTO: 9.3 FL (ref 8.9–12.7)
POTASSIUM SERPL-SCNC: 5.1 MMOL/L (ref 3.5–5.3)
PROT SERPL-MCNC: 7.7 G/DL (ref 6.4–8.4)
PSA SERPL-MCNC: 0.7 NG/ML (ref 0–4)
RBC # BLD AUTO: 5.12 MILLION/UL (ref 3.88–5.62)
SODIUM SERPL-SCNC: 139 MMOL/L (ref 135–147)
TRIGL SERPL-MCNC: 73 MG/DL
WBC # BLD AUTO: 3.96 THOUSAND/UL (ref 4.31–10.16)

## 2023-01-31 ENCOUNTER — TELEPHONE (OUTPATIENT)
Dept: GASTROENTEROLOGY | Facility: CLINIC | Age: 63
End: 2023-01-31

## 2023-01-31 NOTE — TELEPHONE ENCOUNTER
01/31/23  Screened by: Giorgio Urena    Referring Provider     Pre- Screening: There is no height or weight on file to calculate BMI  Has patient been referred for a routine screening Colonoscopy? yes  Is the patient between 39-70 years old? yes      Previous Colonoscopy yes   If yes:    Date: 10/18/2021    Facility:     Reason:       SCHEDULING STAFF: If the patient is between 45yrs-49yrs, please advise patient to confirm benefits/coverage with their insurance company for a routine screening colonoscopy, some insurance carriers will only cover at [address] or older  If the patient is over 66years old, please schedule an office visit  Does the patient want to see a Gastroenterologist prior to their procedure OR are they having any GI symptoms? no    Has the patient been hospitalized or had abdominal surgery in the past 6 months? no    Does the patient use supplemental oxygen? no    Does the patient take Coumadin, Lovenox, Plavix, Elliquis, Xarelto, or other blood thinning medication? no    Has the patient had a stroke, cardiac event, or stent placed in the past year? no     PT PASSED OA  SCHEDULING STAFF: If patient answers NO to above questions, then schedule procedure  If patient answers YES to above questions, then schedule office appointment  If patient is between 45yrs - 49yrs, please advise patient that we will have to confirm benefits & coverage with their insurance company for a routine screening colonoscopy

## 2023-01-31 NOTE — TELEPHONE ENCOUNTER
Scheduled date of colonoscopy (as of today): 6/27/2023    Physician performing colonoscopy: Dr Eufemia Orozco    Location of colonoscopy: [facility]    Clearances: N/A

## 2023-02-14 DIAGNOSIS — K52.9 INFLAMMATORY BOWEL DISEASE: ICD-10-CM

## 2023-02-15 RX ORDER — MESALAMINE 1.2 G/1
4800 TABLET, DELAYED RELEASE ORAL
Qty: 120 TABLET | Refills: 6 | Status: SHIPPED | OUTPATIENT
Start: 2023-02-15 | End: 2023-03-17

## 2023-06-13 DIAGNOSIS — K51.00 ULCERATIVE PANCOLITIS WITHOUT COMPLICATION (HCC): ICD-10-CM

## 2023-06-25 RX ORDER — SODIUM CHLORIDE 9 MG/ML
125 INJECTION, SOLUTION INTRAVENOUS CONTINUOUS
Status: CANCELLED | OUTPATIENT
Start: 2023-06-25

## 2023-06-26 ENCOUNTER — ANESTHESIA (OUTPATIENT)
Dept: ANESTHESIOLOGY | Facility: HOSPITAL | Age: 63
End: 2023-06-26

## 2023-06-26 ENCOUNTER — ANESTHESIA EVENT (OUTPATIENT)
Dept: ANESTHESIOLOGY | Facility: HOSPITAL | Age: 63
End: 2023-06-26

## 2023-06-26 NOTE — ANESTHESIA PREPROCEDURE EVALUATION
Procedure:  PRE-OP ONLY    Relevant Problems   CARDIO   (+) Hyperlipemia   (+) Incomplete right bundle branch block             Anesthesia Plan  ASA Score- 2     Anesthesia Type- IV sedation with anesthesia with ASA Monitors  Additional Monitors:   Airway Plan:           Plan Factors-Exercise tolerance (METS): >4 METS  Chart reviewed  Patient summary reviewed  Patient is not a current smoker  Induction- intravenous  Postoperative Plan-     Informed Consent- Anesthetic plan and risks discussed with patient

## 2023-06-27 ENCOUNTER — ANESTHESIA (OUTPATIENT)
Dept: GASTROENTEROLOGY | Facility: MEDICAL CENTER | Age: 63
End: 2023-06-27

## 2023-06-27 ENCOUNTER — ANESTHESIA EVENT (OUTPATIENT)
Dept: GASTROENTEROLOGY | Facility: MEDICAL CENTER | Age: 63
End: 2023-06-27

## 2023-06-27 ENCOUNTER — HOSPITAL ENCOUNTER (OUTPATIENT)
Dept: GASTROENTEROLOGY | Facility: MEDICAL CENTER | Age: 63
Setting detail: OUTPATIENT SURGERY
Discharge: HOME | End: 2023-06-27
Payer: COMMERCIAL

## 2023-06-27 VITALS
DIASTOLIC BLOOD PRESSURE: 61 MMHG | OXYGEN SATURATION: 96 % | TEMPERATURE: 97.9 F | BODY MASS INDEX: 28.12 KG/M2 | SYSTOLIC BLOOD PRESSURE: 111 MMHG | HEIGHT: 66 IN | RESPIRATION RATE: 16 BRPM | HEART RATE: 58 BPM | WEIGHT: 175 LBS

## 2023-06-27 DIAGNOSIS — K51.00 ULCERATIVE PANCOLITIS WITHOUT COMPLICATION (HCC): ICD-10-CM

## 2023-06-27 RX ORDER — PROPOFOL 10 MG/ML
INJECTION, EMULSION INTRAVENOUS AS NEEDED
Status: DISCONTINUED | OUTPATIENT
Start: 2023-06-27 | End: 2023-06-27

## 2023-06-27 RX ORDER — SODIUM CHLORIDE 9 MG/ML
125 INJECTION, SOLUTION INTRAVENOUS CONTINUOUS
Status: DISCONTINUED | OUTPATIENT
Start: 2023-06-27 | End: 2023-07-01 | Stop reason: HOSPADM

## 2023-06-27 RX ORDER — LIDOCAINE HYDROCHLORIDE 20 MG/ML
INJECTION, SOLUTION EPIDURAL; INFILTRATION; INTRACAUDAL; PERINEURAL AS NEEDED
Status: DISCONTINUED | OUTPATIENT
Start: 2023-06-27 | End: 2023-06-27

## 2023-06-27 RX ADMIN — PROPOFOL 100 MG: 10 INJECTION, EMULSION INTRAVENOUS at 07:37

## 2023-06-27 RX ADMIN — SODIUM CHLORIDE 125 ML/HR: 0.9 INJECTION, SOLUTION INTRAVENOUS at 07:23

## 2023-06-27 RX ADMIN — PROPOFOL 50 MG: 10 INJECTION, EMULSION INTRAVENOUS at 07:42

## 2023-06-27 RX ADMIN — PROPOFOL 50 MG: 10 INJECTION, EMULSION INTRAVENOUS at 07:56

## 2023-06-27 RX ADMIN — LIDOCAINE HYDROCHLORIDE 80 MG: 20 INJECTION, SOLUTION EPIDURAL; INFILTRATION; INTRACAUDAL at 07:32

## 2023-06-27 RX ADMIN — PROPOFOL 100 MG: 10 INJECTION, EMULSION INTRAVENOUS at 07:32

## 2023-06-27 RX ADMIN — PROPOFOL 50 MG: 10 INJECTION, EMULSION INTRAVENOUS at 07:48

## 2023-06-27 NOTE — ANESTHESIA POSTPROCEDURE EVALUATION
"Post-Op Assessment Note    CV Status:  Stable    Pain management: adequate     Mental Status:  Alert and awake   Hydration Status:  Euvolemic   PONV Controlled:  Controlled   Airway Patency:  Patent      Post Op Vitals Reviewed: Yes      Staff: Anesthesiologist         No notable events documented      BP      Temp      Pulse     Resp      SpO2      /61   Pulse 58   Temp 97 9 °F (36 6 °C) (Temporal)   Resp 16   Ht 5' 6\" (1 676 m)   Wt 79 4 kg (175 lb)   SpO2 96%   BMI 28 25 kg/m²     "

## 2023-06-27 NOTE — ANESTHESIA PREPROCEDURE EVALUATION
Procedure:  COLONOSCOPY    Relevant Problems   CARDIO   (+) Hyperlipemia   (+) Incomplete right bundle branch block        Physical Exam    Airway    Mallampati score: III  TM Distance: >3 FB  Neck ROM: full     Dental   No notable dental hx     Cardiovascular  Rhythm: regular, Rate: normal, Cardiovascular exam normal    Pulmonary  Pulmonary exam normal Breath sounds clear to auscultation,     Other Findings        Anesthesia Plan  ASA Score- 2     Anesthesia Type- IV sedation with anesthesia with ASA Monitors  Additional Monitors:   Airway Plan:           Plan Factors-Exercise tolerance (METS): >4 METS  Chart reviewed  Patient summary reviewed  Patient is not a current smoker  Induction- intravenous  Postoperative Plan-     Informed Consent- Anesthetic plan and risks discussed with patient

## 2023-06-27 NOTE — H&P
"History and Physical -  [facility]  Melvin Wilson 65 y o  male MRN: [id number]                  HPI: Melvin Wilson is a 65y o  year old male who presents for colonoscopy eval      REVIEW OF SYSTEMS: Per the HPI, and otherwise unremarkable  Historical Information   Past Medical History:   Diagnosis Date   • Hemorrhoids    • Hyperlipidemia    • Ulcerative colitis ([address] )      Past Surgical History:   Procedure Laterality Date   • COLONOSCOPY     • NASAL SEPTUM SURGERY  1994   • PALATE SURGERY  1994    tighten   • UPPER GASTROINTESTINAL ENDOSCOPY     • UVULECTOMY  13     Social History   Social History     Substance and Sexual Activity   Alcohol Use Yes    Comment: socially     Social History     Substance and Sexual Activity   Drug Use Never     Social History     Tobacco Use   Smoking Status Never   Smokeless Tobacco Never     Family History   Problem Relation Age of Onset   • No Known Problems Mother    • Diabetes Father    • Heart attack Father    • Diabetes Other    • Diabetes Family    • Heart disease Family        Meds/Allergies       Current Outpatient Medications:   •  polyethylene glycol (GOLYTELY) 4000 mL solution  •  polyethylene glycol (GOLYTELY) 4000 mL solution  •  mesalamine (LIALDA) 1 2 g EC tablet  •  Multiple Vitamins-Minerals (MULTIVITAMIN WITH MINERALS) tablet    Current Facility-Administered Medications:   •  sodium chloride 0 9 % infusion, 125 mL/hr, Intravenous, Continuous, 125 mL/hr at 06/27/23 5026    Allergies   Allergen Reactions   • Penicillins Rash       Objective     /71   Pulse 68   Temp 97 9 °F (36 6 °C) (Temporal)   Resp 16   Ht 5' 6\" (1 676 m)   Wt 79 4 kg (175 lb)   SpO2 97%   BMI 28 25 kg/m²       PHYSICAL EXAM    Gen: NAD  Head: NCAT  CV: RRR  CHEST: Clear  ABD: soft, NT/ND  EXT: no edema      ASSESSMENT/PLAN:  This is a 65y o  year old male here for colonoscopy, and he is stable and optimized for his procedure          "

## 2023-09-18 DIAGNOSIS — K52.9 INFLAMMATORY BOWEL DISEASE: ICD-10-CM

## 2023-09-18 NOTE — TELEPHONE ENCOUNTER
Reason for call:   [x] Refill   [] Prior Auth  [] Other:     Office:   [x] PCP/Provider -  Darlene Alpers, MD / PCP   [] Speciality/Provider -     Medication: Mesalamine (LIALDA) 1.2 g EC tablet     Frequency: Take 4 tablets (4.8 g total) by mouth daily with breakfast    Quantity: #120 w/ 5R    Pharmacy: [address] [place] [address]    Does the patient have enough for 3 days?    [x] Yes   [] No - Send as HP to POD

## 2023-09-19 RX ORDER — MESALAMINE 1.2 G/1
4800 TABLET, DELAYED RELEASE ORAL
Qty: 120 TABLET | Refills: 5 | Status: SHIPPED | OUTPATIENT
Start: 2023-09-19 | End: 2024-03-17

## 2023-10-12 ENCOUNTER — OFFICE VISIT (OUTPATIENT)
Dept: URGENT CARE | Age: 63
End: 2023-10-12
Payer: COMMERCIAL

## 2023-10-12 VITALS
HEART RATE: 69 BPM | BODY MASS INDEX: 27.32 KG/M2 | SYSTOLIC BLOOD PRESSURE: 120 MMHG | HEIGHT: 66 IN | WEIGHT: 170 LBS | DIASTOLIC BLOOD PRESSURE: 82 MMHG | OXYGEN SATURATION: 97 % | RESPIRATION RATE: 18 BRPM | TEMPERATURE: 96.9 F

## 2023-10-12 DIAGNOSIS — J20.9 ACUTE BRONCHITIS, UNSPECIFIED ORGANISM: Primary | ICD-10-CM

## 2023-10-12 PROCEDURE — 99213 OFFICE O/P EST LOW 20 MIN: CPT | Performed by: STUDENT IN AN ORGANIZED HEALTH CARE EDUCATION/TRAINING PROGRAM

## 2023-10-12 RX ORDER — AZITHROMYCIN 250 MG/1
TABLET, FILM COATED ORAL
Qty: 6 TABLET | Refills: 0 | Status: SHIPPED | OUTPATIENT
Start: 2023-10-12 | End: 2023-10-16

## 2023-10-12 NOTE — PATIENT INSTRUCTIONS
- Take mucinex or guaifenesin 600mg twice per day with good hydration.  You may continue with dayquil.  - Stay well hydrated  - Come back to get checked if you are not improving or if you have any worsening of your symptoms  - For any severe symptoms such as shortness of breath or chest pain, please go to the ER

## 2023-10-12 NOTE — PROGRESS NOTES
[facility]        NAME: Sayda Echevarria is a 59 y.o. male  : 1960    MRN: [id number]  DATE: 2023  TIME: 3:48 PM    Assessment and Plan   Acute bronchitis, unspecified organism [J20.9]  1. Acute bronchitis, unspecified organism  azithromycin (ZITHROMAX) 250 mg tablet      Add mucinex to help with clearing mucus. He is taking max of 1200 with dayquil daily, may add 600 BID regular guaifenesin. Cover with azithromycin. Will return to us or PCP if not improving or worsening. Patient Instructions       Follow up with PCP in 3-5 days. Proceed to  ER if symptoms worsen. Chief Complaint     Chief Complaint   Patient presents with    Cold Like Symptoms     1.5 weeks - sometimes productive cough and chest congestion. Denies any other sx. Home COVID test negative today. History of Present Illness       1.5 weeks of chest congestion, cough (sometimes productive, thicker mucus). No fever, chills, sob, pain with breathing. No hx of lung disease. His only med is mesalamine for UC. Home covid negative. Occasionally feels a slight wheeze. Denies rhinorrhea, nasal congestion, pnd.  Using dayquil severe with improvement, but symptoms come back. Review of Systems   Review of Systems   All other systems reviewed and are negative.         Current Medications       Current Outpatient Medications:     azithromycin (ZITHROMAX) 250 mg tablet, Take 2 tablets today then 1 tablet daily x 4 days, Disp: 6 tablet, Rfl: 0    mesalamine (LIALDA) 1.2 g EC tablet, Take 4 tablets (4.8 g total) by mouth daily with breakfast, Disp: 120 tablet, Rfl: 5    Multiple Vitamins-Minerals (MULTIVITAMIN WITH MINERALS) tablet, Take 1 tablet by mouth daily (Patient not taking: Reported on 10/12/2023), Disp: , Rfl:     Current Allergies     Allergies as of 10/12/2023 - Reviewed 10/12/2023   Allergen Reaction Noted    Penicillins Rash 2016            The following portions of the patient's history were reviewed and updated as appropriate: allergies, current medications, past family history, past medical history, past social history, past surgical history and problem list.     Past Medical History:   Diagnosis Date    Hemorrhoids     Hyperlipidemia     Ulcerative colitis ([address])        Past Surgical History:   Procedure Laterality Date    COLONOSCOPY      [address]    tighten    UPPER GASTROINTESTINAL ENDOSCOPY      UVULECTOMY  1994       Family History   Problem Relation Age of Onset    No Known Problems Mother     Diabetes Father     Heart attack Father     Diabetes Other     Diabetes Family     Heart disease Family          Medications have been verified. Objective   /82 (BP Location: Left arm, Patient Position: Sitting, Cuff Size: Standard)   Pulse 69   Temp (!) 96.9 °F (36.1 °C) (Tympanic)   Resp 18   Ht 5' 6" (1.676 m)   Wt 77.1 kg (170 lb)   SpO2 97%   BMI 27.44 kg/m²   No LMP for male patient. Physical Exam     Physical Exam  Constitutional:       General: He is not in acute distress. Appearance: Normal appearance. He is not ill-appearing or toxic-appearing. HENT:      Head: Normocephalic and atraumatic. Right Ear: External ear normal.      Left Ear: External ear normal.      Nose: Nose normal. No congestion or rhinorrhea. Mouth/Throat:      Mouth: Mucous membranes are moist.      Comments: cobblestoning  Eyes:      Extraocular Movements: Extraocular movements intact. Cardiovascular:      Rate and Rhythm: Normal rate and regular rhythm. Heart sounds: Normal heart sounds. Pulmonary:      Effort: Pulmonary effort is normal. No respiratory distress. Breath sounds: No stridor. Wheezing present. No rhonchi or rales. Comments: Clear b/l with a very faint expiratory wheeze LLL  Musculoskeletal:         General: No swelling, tenderness or deformity. Right lower leg: No edema. Left lower leg: No edema. Skin:     General: Skin is warm and dry. Capillary Refill: Capillary refill takes less than 2 seconds. Findings: No rash. Neurological:      Mental Status: He is alert.       Gait: Gait normal.   Psychiatric:         Behavior: Behavior normal.

## 2024-01-19 ENCOUNTER — RA CDI HCC (OUTPATIENT)
Dept: OTHER | Facility: HOSPITAL | Age: 64
End: 2024-01-19

## 2024-01-22 ENCOUNTER — OFFICE VISIT (OUTPATIENT)
Dept: INTERNAL MEDICINE CLINIC | Facility: CLINIC | Age: 64
End: 2024-01-22
Payer: COMMERCIAL

## 2024-01-22 VITALS
OXYGEN SATURATION: 99 % | WEIGHT: 179 LBS | BODY MASS INDEX: 28.77 KG/M2 | DIASTOLIC BLOOD PRESSURE: 64 MMHG | TEMPERATURE: 97.5 F | HEART RATE: 79 BPM | SYSTOLIC BLOOD PRESSURE: 110 MMHG | HEIGHT: 66 IN

## 2024-01-22 DIAGNOSIS — R73.02 GLUCOSE INTOLERANCE (IMPAIRED GLUCOSE TOLERANCE): ICD-10-CM

## 2024-01-22 DIAGNOSIS — K51.00 ULCERATIVE PANCOLITIS WITHOUT COMPLICATION (HCC): Primary | ICD-10-CM

## 2024-01-22 DIAGNOSIS — Z23 ENCOUNTER FOR IMMUNIZATION: ICD-10-CM

## 2024-01-22 DIAGNOSIS — K51.00 ULCERATIVE (CHRONIC) PANCOLITIS WITHOUT COMPLICATIONS (HCC): ICD-10-CM

## 2024-01-22 DIAGNOSIS — N52.9 ERECTILE DYSFUNCTION, UNSPECIFIED ERECTILE DYSFUNCTION TYPE: ICD-10-CM

## 2024-01-22 DIAGNOSIS — E78.5 HYPERLIPIDEMIA, UNSPECIFIED HYPERLIPIDEMIA TYPE: ICD-10-CM

## 2024-01-22 DIAGNOSIS — Z13.0 SCREENING FOR DEFICIENCY ANEMIA: ICD-10-CM

## 2024-01-22 DIAGNOSIS — R39.9 UTI SYMPTOMS: ICD-10-CM

## 2024-01-22 DIAGNOSIS — E73.9 LACTOSE INTOLERANCE: ICD-10-CM

## 2024-01-22 DIAGNOSIS — Z12.5 PROSTATE CANCER SCREENING: ICD-10-CM

## 2024-01-22 PROCEDURE — 90471 IMMUNIZATION ADMIN: CPT | Performed by: INTERNAL MEDICINE

## 2024-01-22 PROCEDURE — 99396 PREV VISIT EST AGE 40-64: CPT | Performed by: INTERNAL MEDICINE

## 2024-01-22 PROCEDURE — 90686 IIV4 VACC NO PRSV 0.5 ML IM: CPT | Performed by: INTERNAL MEDICINE

## 2024-01-22 RX ORDER — AZITHROMYCIN 250 MG/1
TABLET, FILM COATED ORAL
COMMUNITY
Start: 2024-01-09 | End: 2024-01-22

## 2024-01-22 RX ORDER — SILDENAFIL 50 MG/1
50 TABLET, FILM COATED ORAL DAILY PRN
Qty: 6 TABLET | Refills: 2 | Status: SHIPPED | OUTPATIENT
Start: 2024-01-22

## 2024-01-22 NOTE — PROGRESS NOTES
Name: Geremias Whatley      : 1960      MRN: [id number]  Encounter Provider: Charanjit Thompson MD  Encounter Date: 2024   Encounter department: [facility] INTERNAL MEDICINE    Assessment & Plan     1. Glucose intolerance (impaired glucose tolerance)  Assessment & Plan:  Previous mild elevation in fasting blood sugar will be followed up with a current comprehensive metabolic profile will review with patient upon completion.  He is urged the cautious about consumption of concentrated sugars and excessive carbohydrates    Orders:  -     Comprehensive metabolic panel; Future    2. Hyperlipidemia, unspecified hyperlipidemia type  Assessment & Plan:  And is encouraged to continue to follow a low-cholesterol diet for his general health an updated lipid profile has been requested and will be reviewed with the patient.    Orders:  -     Lipid panel; Future; Expected date: 2024    3. Prostate cancer screening  -     PSA, Total Screen; Future    4. Screening for deficiency anemia  -     CBC and differential; Future; Expected date: 2024    5. UTI symptoms  -     Urinalysis with microscopic; Future    6. Encounter for immunization  -     influenza vaccine, quadrivalent, 0.5 mL, preservative-free, for adult and pediatric patients 6 mos+ (AFLURIA, FLUARIX, FLULAVAL, FLUZONE)    7. Erectile dysfunction, unspecified erectile dysfunction type  Assessment & Plan:  Patient confided he is having erectile dysfunction symptoms inquired about possibility of medication to assist.  We discussed Viagra medication and its benefits and side effects and proper use and a prescription was provided to the patient today    Orders:  -     sildenafil (VIAGRA) 50 MG tablet; Take 1 tablet (50 mg total) by mouth daily as needed for erectile dysfunction    8. Ulcerative (chronic) pancolitis without complications (HCC)    9. Ulcerative pancolitis without complication (HCC)  Assessment & Plan:  Indicates that his  "ulcerative colitis has been reasonably stable he continues on mesalamine 4800 mg on a daily basis at breakfast.  He continues with regular follow-up with GI services and has an annual colonoscopy.      10. Lactose intolerance  Assessment & Plan:  Mild lactose intolerance noted on previous assessment recommend continued care and consumption of lactose containing products             Subjective      This 63-year-old gentleman returns to our office today for an annual physical examination.  He reports feeling well while he is retired from [employer] as a  he is doing some part-time work with another local [employer] firm.  He has been working on an average 2 days a week.  He reports no symptoms or concerns on today's visit.  He has inquired about a possible trial of Viagra.  We discussed this medication is proper use and provided him with a prescription.      Review of Systems   Genitourinary:         Erectile dysfunction   All other systems reviewed and are negative.      Current Outpatient Medications on File Prior to Visit   Medication Sig    mesalamine (LIALDA) 1.2 g EC tablet Take 4 tablets (4.8 g total) by mouth daily with breakfast    [DISCONTINUED] azithromycin (ZITHROMAX) 250 mg tablet take 2 tablets by mouth on day 1 then take 1 tablet by mouth once daily for 4 days (Patient not taking: Reported on 1/22/2024)    [DISCONTINUED] Multiple Vitamins-Minerals (MULTIVITAMIN WITH MINERALS) tablet Take 1 tablet by mouth daily (Patient not taking: Reported on 10/12/2023)       Objective     /64   Pulse 79   Temp 97.5 °F (36.4 °C)   Ht 5' 6\" (1.676 m)   Wt 81.2 kg (179 lb)   SpO2 99%   BMI 28.89 kg/m²     Physical Exam  Constitutional:       General: He is not in acute distress.     Appearance: Normal appearance. He is not ill-appearing.   HENT:      Head: Normocephalic.      Right Ear: Tympanic membrane, ear canal and external ear normal.      Left Ear: Tympanic membrane, ear canal and external ear normal.     "  Nose: Nose normal.      Mouth/Throat:      Mouth: Mucous membranes are moist.      Pharynx: Oropharynx is clear.   Eyes:      Extraocular Movements: Extraocular movements intact.      Conjunctiva/sclera: Conjunctivae normal.      Pupils: Pupils are equal, round, and reactive to light.   Neck:      Vascular: No carotid bruit.   Cardiovascular:      Rate and Rhythm: Regular rhythm.      Heart sounds: Normal heart sounds.   Pulmonary:      Effort: Pulmonary effort is normal. No respiratory distress.      Breath sounds: No wheezing, rhonchi or rales.   Abdominal:      General: Abdomen is flat. Bowel sounds are normal. There is no distension.      Palpations: There is no mass.      Tenderness: There is no abdominal tenderness. There is no guarding.      Hernia: There is no hernia in the left inguinal area or right inguinal area.   Genitourinary:     Penis: Normal.       Testes: Normal.      Epididymis:      Right: Normal.      Left: Normal.      Prostate: Normal.      Rectum: Normal.   Musculoskeletal:         General: No swelling or tenderness.      Cervical back: Normal range of motion and neck supple. No rigidity or tenderness.      Right lower leg: No edema.      Left lower leg: No edema.   Lymphadenopathy:      Cervical: No cervical adenopathy.      Lower Body: No right inguinal adenopathy. No left inguinal adenopathy.   Skin:     Coloration: Skin is not jaundiced or pale.   Neurological:      General: No focal deficit present.      Mental Status: He is alert. Mental status is at baseline.   Psychiatric:         Mood and Affect: Mood normal.         Behavior: Behavior normal.         Thought Content: Thought content normal.         Judgment: Judgment normal.       Charanjit Thompson MD

## 2024-01-22 NOTE — ASSESSMENT & PLAN NOTE
Previous mild elevation in fasting blood sugar will be followed up with a current comprehensive metabolic profile will review with patient upon completion.  He is urged the cautious about consumption of concentrated sugars and excessive carbohydrates

## 2024-01-22 NOTE — ASSESSMENT & PLAN NOTE
Mild lactose intolerance noted on previous assessment recommend continued care and consumption of lactose containing products

## 2024-01-22 NOTE — ASSESSMENT & PLAN NOTE
Indicates that his ulcerative colitis has been reasonably stable he continues on mesalamine 4800 mg on a daily basis at breakfast.  He continues with regular follow-up with GI services and has an annual colonoscopy.

## 2024-01-22 NOTE — ASSESSMENT & PLAN NOTE
And is encouraged to continue to follow a low-cholesterol diet for his general health an updated lipid profile has been requested and will be reviewed with the patient.

## 2024-01-22 NOTE — ASSESSMENT & PLAN NOTE
Patient confided he is having erectile dysfunction symptoms inquired about possibility of medication to assist.  We discussed Viagra medication and its benefits and side effects and proper use and a prescription was provided to the patient today

## 2024-02-19 ENCOUNTER — APPOINTMENT (OUTPATIENT)
Dept: LAB | Facility: CLINIC | Age: 64
End: 2024-02-19
Payer: COMMERCIAL

## 2024-02-19 DIAGNOSIS — Z13.0 SCREENING FOR DEFICIENCY ANEMIA: ICD-10-CM

## 2024-02-19 DIAGNOSIS — R73.02 GLUCOSE INTOLERANCE (IMPAIRED GLUCOSE TOLERANCE): ICD-10-CM

## 2024-02-19 DIAGNOSIS — Z12.5 PROSTATE CANCER SCREENING: ICD-10-CM

## 2024-02-19 DIAGNOSIS — E78.5 HYPERLIPIDEMIA, UNSPECIFIED HYPERLIPIDEMIA TYPE: ICD-10-CM

## 2024-02-19 DIAGNOSIS — R39.9 UTI SYMPTOMS: ICD-10-CM

## 2024-02-19 LAB
ALBUMIN SERPL BCP-MCNC: 4 G/DL (ref 3.5–5)
ALP SERPL-CCNC: 78 U/L (ref 34–104)
ALT SERPL W P-5'-P-CCNC: 22 U/L (ref 7–52)
ANION GAP SERPL CALCULATED.3IONS-SCNC: 5 MMOL/L
AST SERPL W P-5'-P-CCNC: 22 U/L (ref 13–39)
BILIRUB SERPL-MCNC: 0.55 MG/DL (ref 0.2–1)
BUN SERPL-MCNC: 17 MG/DL (ref 5–25)
CALCIUM SERPL-MCNC: 9.4 MG/DL (ref 8.4–10.2)
CHLORIDE SERPL-SCNC: 104 MMOL/L (ref 96–108)
CO2 SERPL-SCNC: 31 MMOL/L (ref 21–32)
CREAT SERPL-MCNC: 0.99 MG/DL (ref 0.6–1.3)
GFR SERPL CREATININE-BSD FRML MDRD: 81 ML/MIN/1.73SQ M
GLUCOSE P FAST SERPL-MCNC: 90 MG/DL (ref 65–99)
POTASSIUM SERPL-SCNC: 5.4 MMOL/L (ref 3.5–5.3)
PROT SERPL-MCNC: 7 G/DL (ref 6.4–8.4)
PSA SERPL-MCNC: 0.86 NG/ML (ref 0–4)
SODIUM SERPL-SCNC: 140 MMOL/L (ref 135–147)

## 2024-02-19 PROCEDURE — 36415 COLL VENOUS BLD VENIPUNCTURE: CPT

## 2024-02-19 PROCEDURE — 81001 URINALYSIS AUTO W/SCOPE: CPT

## 2024-02-19 PROCEDURE — G0103 PSA SCREENING: HCPCS

## 2024-02-19 PROCEDURE — 80053 COMPREHEN METABOLIC PANEL: CPT

## 2024-02-20 LAB
BACTERIA UR QL AUTO: NORMAL /HPF
BILIRUB UR QL STRIP: NEGATIVE
CLARITY UR: CLEAR
COLOR UR: NORMAL
GLUCOSE UR STRIP-MCNC: NEGATIVE MG/DL
HGB UR QL STRIP.AUTO: NEGATIVE
KETONES UR STRIP-MCNC: NEGATIVE MG/DL
LEUKOCYTE ESTERASE UR QL STRIP: NEGATIVE
NITRITE UR QL STRIP: NEGATIVE
NON-SQ EPI CELLS URNS QL MICRO: NORMAL /HPF
PH UR STRIP.AUTO: 6 [PH]
PROT UR STRIP-MCNC: NEGATIVE MG/DL
RBC #/AREA URNS AUTO: NORMAL /HPF
SP GR UR STRIP.AUTO: 1.01 (ref 1–1.03)
UROBILINOGEN UR STRIP-ACNC: <2 MG/DL
WBC #/AREA URNS AUTO: NORMAL /HPF

## 2024-02-21 PROBLEM — Z00.00 HEALTH MAINTENANCE EXAMINATION: Status: RESOLVED | Noted: 2018-12-17 | Resolved: 2024-02-21

## 2024-03-16 DIAGNOSIS — K52.9 INFLAMMATORY BOWEL DISEASE: ICD-10-CM

## 2024-03-18 ENCOUNTER — TELEPHONE (OUTPATIENT)
Dept: INTERNAL MEDICINE CLINIC | Facility: CLINIC | Age: 64
End: 2024-03-18

## 2024-03-18 RX ORDER — MESALAMINE 1.2 G/1
TABLET, DELAYED RELEASE ORAL
Qty: 120 TABLET | Refills: 5 | Status: SHIPPED | OUTPATIENT
Start: 2024-03-18

## 2024-03-23 ENCOUNTER — OFFICE VISIT (OUTPATIENT)
Dept: URGENT CARE | Age: 64
End: 2024-03-23
Payer: COMMERCIAL

## 2024-03-23 VITALS
SYSTOLIC BLOOD PRESSURE: 166 MMHG | OXYGEN SATURATION: 98 % | HEART RATE: 66 BPM | TEMPERATURE: 97.6 F | RESPIRATION RATE: 18 BRPM | DIASTOLIC BLOOD PRESSURE: 74 MMHG

## 2024-03-23 DIAGNOSIS — B35.1 ONYCHOMYCOSIS: Primary | ICD-10-CM

## 2024-03-23 PROCEDURE — 99213 OFFICE O/P EST LOW 20 MIN: CPT

## 2024-03-23 RX ORDER — PRENATAL VIT 91/IRON/FOLIC/DHA 28-975-200
COMBINATION PACKAGE (EA) ORAL 2 TIMES DAILY
Qty: 28 G | Refills: 0 | Status: SHIPPED | OUTPATIENT
Start: 2024-03-23

## 2024-03-23 NOTE — PATIENT INSTRUCTIONS
Apply ointment around nailbed and apply betadine to nailbeds twice daily for up to the next 2 weeks as symptoms persist. May take tylenol/ibuprofen every 4-6 hours as needed for pain. Follow-up with PCP in 3-5 days if no improvement of symptoms. Report to ER if symptoms worsen or develop worsening pain, redness, or swelling around site of injury.

## 2024-03-23 NOTE — PROGRESS NOTES
[facility]        NAME: Geremias Whatley is a 63 y.o. male  : 1960    MRN: [id number]  DATE: 2024  TIME: 2:03 PM    Assessment and Plan   Onychomycosis [B35.1]  1. Onychomycosis  terbinafine (LamISIL) 1 % cream        Physical exam consistent with nailbed fungus, cream as directed. Patient provided betadine swabs, instructed on use. VSS in clinic, appears in no acute distress. Educated on use of OTC products for additional relief of symptoms. Advised close follow-up with PCP or to report to the ER if symptoms worsen. Patient verbalizes understanding and agreeable to plan.       Patient Instructions     Apply ointment around nailbed and apply betadine to nailbeds twice daily for up to the next 2 weeks as symptoms persist. May take tylenol/ibuprofen every 4-6 hours as needed for pain. Follow-up with PCP in 3-5 days if no improvement of symptoms. Report to ER if symptoms worsen or develop worsening pain, redness, or swelling around site of injury.     Chief Complaint     Chief Complaint   Patient presents with    Nail Problem     Patient is concerned that he may have a nail bed infection of his left middle finger- been dealing with an area for the last 2 weeks         History of Present Illness       63 year old male presents for evaluation of swelling around his finger nails and cracked finger nail ongoing for the past 2 weeks. He reports he does use his hands a lot and has a history of nail fungus in the past that he self-medicates with hydrogen peroxide. He denies fevers, discharge, numbness/tingling in his hands, or difficulty moving his digits. He denies h/o diabetes. He has not tried any interventions for symptoms.     Wound Check  His temperature was unmeasured prior to arrival. There is new redness present. There is new swelling present. There is new pain present. He has no difficulty moving the affected extremity or digit.       Review of Systems   Review of Systems    Constitutional:  Negative for activity change, appetite change, chills, fatigue and fever.   Respiratory:  Negative for cough, chest tightness and shortness of breath.    Cardiovascular:  Negative for chest pain.   Gastrointestinal:  Negative for abdominal pain.   Musculoskeletal:  Negative for arthralgias, joint swelling and myalgias.   Skin:  Positive for color change, rash and wound. Negative for pallor.   Allergic/Immunologic: Negative for environmental allergies and food allergies.   Neurological:  Negative for dizziness, light-headedness and headaches.         Current Medications       Current Outpatient Medications:     terbinafine (LamISIL) 1 % cream, Apply topically 2 (two) times a day, Disp: 28 g, Rfl: 0    mesalamine (LIALDA) 1.2 g EC tablet, take 4 tablets by mouth once daily with breakfast, Disp: 120 tablet, Rfl: 5    sildenafil (VIAGRA) 50 MG tablet, Take 1 tablet (50 mg total) by mouth daily as needed for erectile dysfunction, Disp: 6 tablet, Rfl: 2    Current Allergies     Allergies as of 03/23/2024 - Reviewed 03/23/2024   Allergen Reaction Noted    Penicillins Rash 12/05/2016            The following portions of the patient's history were reviewed and updated as appropriate: allergies, current medications, past family history, past medical history, past social history, past surgical history and problem list.     Past Medical History:   Diagnosis Date    Hemorrhoids     Hyperlipidemia     Ulcerative colitis (HCC)        Past Surgical History:   Procedure Laterality Date    COLONOSCOPY      NASAL SEPTUM SURGERY  1994    PALATE SURGERY  1994    tighten    UPPER GASTROINTESTINAL ENDOSCOPY      UVULECTOMY  1994       Family History   Problem Relation Age of Onset    No Known Problems Mother     Diabetes Father     Heart attack Father     Diabetes Other     Diabetes Family     Heart disease Family          Medications have been verified.        Objective   /74 (BP Location: Right arm, Patient  Position: Sitting, Cuff Size: Standard)   Pulse 66   Temp 97.6 °F (36.4 °C)   Resp 18   SpO2 98%        Physical Exam     Physical Exam  Vitals and nursing note reviewed.   Constitutional:       General: He is awake. He is not in acute distress.     Appearance: Normal appearance. He is well-developed and normal weight.   HENT:      Head: Normocephalic and atraumatic.      Right Ear: Hearing normal.      Left Ear: Hearing normal.      Mouth/Throat:      Lips: Pink.      Mouth: Mucous membranes are moist.   Cardiovascular:      Rate and Rhythm: Normal rate.      Pulses: Normal pulses.   Pulmonary:      Effort: Pulmonary effort is normal.   Musculoskeletal:         General: Normal range of motion.      Right hand: No swelling or bony tenderness. Normal range of motion. Normal strength. Normal pulse.      Left hand: Swelling present. No bony tenderness. Normal range of motion. Normal strength. Normal pulse.      Cervical back: Normal range of motion and neck supple.      Comments: Swelling/erythema present around nailbeds of second and third digit of left hand   Skin:     General: Skin is warm and dry.      Capillary Refill: Capillary refill takes less than 2 seconds.      Findings: Erythema present.      Nails: There is no clubbing.      Comments: Left third digit nailbed cracked. Yellow discoloration in nail. Mild erythema present around nailbed of second and third digit of left hand. No discharge present.   Neurological:      General: No focal deficit present.      Mental Status: He is alert and oriented to person, place, and time.   Psychiatric:         Mood and Affect: Mood normal.         Behavior: Behavior normal. Behavior is cooperative.         Thought Content: Thought content normal.         Judgment: Judgment normal.

## 2024-05-20 ENCOUNTER — APPOINTMENT (OUTPATIENT)
Dept: LAB | Facility: CLINIC | Age: 64
End: 2024-05-20
Payer: COMMERCIAL

## 2024-05-20 LAB
BASOPHILS # BLD AUTO: 0.02 THOUSANDS/ÂΜL (ref 0–0.1)
BASOPHILS NFR BLD AUTO: 1 % (ref 0–1)
CHOLEST SERPL-MCNC: 189 MG/DL
EOSINOPHIL # BLD AUTO: 0.17 THOUSAND/ÂΜL (ref 0–0.61)
EOSINOPHIL NFR BLD AUTO: 4 % (ref 0–6)
ERYTHROCYTE [DISTWIDTH] IN BLOOD BY AUTOMATED COUNT: 12 % (ref 11.6–15.1)
HCT VFR BLD AUTO: 44.6 % (ref 36.5–49.3)
HDLC SERPL-MCNC: 37 MG/DL
HGB BLD-MCNC: 14.2 G/DL (ref 12–17)
IMM GRANULOCYTES # BLD AUTO: 0.01 THOUSAND/UL (ref 0–0.2)
IMM GRANULOCYTES NFR BLD AUTO: 0 % (ref 0–2)
LDLC SERPL CALC-MCNC: 133 MG/DL (ref 0–100)
LYMPHOCYTES # BLD AUTO: 1.32 THOUSANDS/ÂΜL (ref 0.6–4.47)
LYMPHOCYTES NFR BLD AUTO: 30 % (ref 14–44)
MCH RBC QN AUTO: 29.2 PG (ref 26.8–34.3)
MCHC RBC AUTO-ENTMCNC: 31.8 G/DL (ref 31.4–37.4)
MCV RBC AUTO: 92 FL (ref 82–98)
MONOCYTES # BLD AUTO: 0.45 THOUSAND/ÂΜL (ref 0.17–1.22)
MONOCYTES NFR BLD AUTO: 10 % (ref 4–12)
NEUTROPHILS # BLD AUTO: 2.46 THOUSANDS/ÂΜL (ref 1.85–7.62)
NEUTS SEG NFR BLD AUTO: 55 % (ref 43–75)
NONHDLC SERPL-MCNC: 152 MG/DL
NRBC BLD AUTO-RTO: 0 /100 WBCS
PLATELET # BLD AUTO: 346 THOUSANDS/UL (ref 149–390)
PMV BLD AUTO: 10.1 FL (ref 8.9–12.7)
RBC # BLD AUTO: 4.87 MILLION/UL (ref 3.88–5.62)
TRIGL SERPL-MCNC: 93 MG/DL
WBC # BLD AUTO: 4.43 THOUSAND/UL (ref 4.31–10.16)

## 2024-09-10 DIAGNOSIS — K52.9 INFLAMMATORY BOWEL DISEASE: ICD-10-CM

## 2024-09-10 RX ORDER — MESALAMINE 1.2 G/1
TABLET, DELAYED RELEASE ORAL
Qty: 120 TABLET | Refills: 5 | Status: SHIPPED | OUTPATIENT
Start: 2024-09-10

## 2024-10-07 ENCOUNTER — APPOINTMENT (OUTPATIENT)
Dept: URGENT CARE | Facility: MEDICAL CENTER | Age: 64
End: 2024-10-07

## 2025-01-06 ENCOUNTER — TELEPHONE (OUTPATIENT)
Age: 65
End: 2025-01-06

## 2025-01-06 NOTE — TELEPHONE ENCOUNTER
Currently unable to get patient scheduled explained I will need to call him back after speaking with my supervisor / Patient okay with awaiting cb

## 2025-01-06 NOTE — TELEPHONE ENCOUNTER
01/06/25  Screened by: Alcira Danielson    Referring Provider Recall    Pre- Screening:     There is no height or weight on file to calculate BMI. BMI - 28.89  Has patient been referred for a routine screening Colonoscopy? yes  Is the patient between 45-75 years old? yes      Previous Colonoscopy yes   If yes:    Date: 6/27/2023    Facility: [facility]    Reason: Screening        Does the patient want to see a Gastroenterologist prior to their procedure OR are they having any GI symptoms? no    Has the patient been hospitalized or had abdominal surgery in the past 6 months? no    Does the patient use supplemental oxygen? no    Does the patient take Coumadin, Lovenox, Plavix, Elliquis, Xarelto, or other blood thinning medication? no    Has the patient had a stroke, cardiac event, or stent placed in the past year? no      If patient is between 45yrs - 49yrs, please advise patient that we will have to confirm benefits & coverage with their insurance company for a routine screening colonoscopy.

## 2025-01-07 ENCOUNTER — PREP FOR PROCEDURE (OUTPATIENT)
Age: 65
End: 2025-01-07

## 2025-01-07 DIAGNOSIS — K52.9 INFLAMMATORY BOWEL DISEASE: Primary | ICD-10-CM

## 2025-01-07 NOTE — TELEPHONE ENCOUNTER
Patient called in to [facility] Colonoscopy     Scheduled date of colonoscopy (as of today): 1/14   Physician performing colonoscopy: ANDRE   Location of colonoscopy: [facility]   Bowel prep reviewed with patient: GWENDOLYN   Instructions reviewed with patient by: [email or web address]  Clearances: NONE

## 2025-01-07 NOTE — TELEPHONE ENCOUNTER
Message left on [employer] to cb to complete procedure scheduling / order already placed. Will await cb    1

## 2025-01-13 ENCOUNTER — ANESTHESIA (OUTPATIENT)
Dept: ANESTHESIOLOGY | Facility: HOSPITAL | Age: 65
End: 2025-01-13

## 2025-01-13 ENCOUNTER — ANESTHESIA EVENT (OUTPATIENT)
Dept: ANESTHESIOLOGY | Facility: HOSPITAL | Age: 65
End: 2025-01-13

## 2025-01-14 ENCOUNTER — ANESTHESIA (OUTPATIENT)
Dept: GASTROENTEROLOGY | Facility: AMBULARY SURGERY CENTER | Age: 65
End: 2025-01-14
Payer: COMMERCIAL

## 2025-01-14 ENCOUNTER — HOSPITAL ENCOUNTER (OUTPATIENT)
Dept: GASTROENTEROLOGY | Facility: AMBULARY SURGERY CENTER | Age: 65
Setting detail: OUTPATIENT SURGERY
Discharge: HOME | End: 2025-01-14
Attending: INTERNAL MEDICINE
Payer: COMMERCIAL

## 2025-01-14 VITALS
SYSTOLIC BLOOD PRESSURE: 101 MMHG | HEIGHT: 66 IN | BODY MASS INDEX: 27.64 KG/M2 | OXYGEN SATURATION: 96 % | RESPIRATION RATE: 16 BRPM | WEIGHT: 172 LBS | DIASTOLIC BLOOD PRESSURE: 61 MMHG | HEART RATE: 62 BPM | TEMPERATURE: 97.6 F

## 2025-01-14 DIAGNOSIS — K52.9 INFLAMMATORY BOWEL DISEASE: ICD-10-CM

## 2025-01-14 PROCEDURE — 88305 TISSUE EXAM BY PATHOLOGIST: CPT | Performed by: PATHOLOGY

## 2025-01-14 PROCEDURE — 45385 COLONOSCOPY W/LESION REMOVAL: CPT | Performed by: INTERNAL MEDICINE

## 2025-01-14 PROCEDURE — 45380 COLONOSCOPY AND BIOPSY: CPT | Performed by: INTERNAL MEDICINE

## 2025-01-14 RX ORDER — SODIUM CHLORIDE, SODIUM LACTATE, POTASSIUM CHLORIDE, CALCIUM CHLORIDE 600; 310; 30; 20 MG/100ML; MG/100ML; MG/100ML; MG/100ML
INJECTION, SOLUTION INTRAVENOUS CONTINUOUS PRN
Status: DISCONTINUED | OUTPATIENT
Start: 2025-01-14 | End: 2025-01-14

## 2025-01-14 RX ORDER — PROPOFOL 10 MG/ML
INJECTION, EMULSION INTRAVENOUS AS NEEDED
Status: DISCONTINUED | OUTPATIENT
Start: 2025-01-14 | End: 2025-01-14

## 2025-01-14 RX ADMIN — PROPOFOL 100 MG: 10 INJECTION, EMULSION INTRAVENOUS at 07:54

## 2025-01-14 RX ADMIN — PROPOFOL 50 MG: 10 INJECTION, EMULSION INTRAVENOUS at 08:03

## 2025-01-14 RX ADMIN — SODIUM CHLORIDE, SODIUM LACTATE, POTASSIUM CHLORIDE, AND CALCIUM CHLORIDE: .6; .31; .03; .02 INJECTION, SOLUTION INTRAVENOUS at 07:52

## 2025-01-14 RX ADMIN — PROPOFOL 50 MG: 10 INJECTION, EMULSION INTRAVENOUS at 07:56

## 2025-01-14 RX ADMIN — PROPOFOL 50 MG: 10 INJECTION, EMULSION INTRAVENOUS at 07:58

## 2025-01-14 NOTE — ANESTHESIA PREPROCEDURE EVALUATION
Procedure:  COLONOSCOPY    Relevant Problems   ANESTHESIA (within normal limits)      CARDIO   (+) Hyperlipemia   (+) Incomplete right bundle branch block      FEN/Gastrointestinal   (+) Ulcerative colitis (HCC)        Physical Exam    Airway    Mallampati score: III  TM Distance: >3 FB  Neck ROM: full     Dental        Cardiovascular      Pulmonary      Other Findings        Anesthesia Plan  ASA Score- 2     Anesthesia Type- IV sedation with anesthesia with ASA Monitors.         Additional Monitors:     Airway Plan:            Plan Factors-Exercise tolerance (METS): >4 METS.    Chart reviewed.   Existing labs reviewed. Patient summary reviewed.    Patient is not a current smoker.              Induction- intravenous.    Postoperative Plan-         Informed Consent- Anesthetic plan and risks discussed with patient.  I personally reviewed this patient with the CRNA. Discussed and agreed on the Anesthesia Plan with the CRNA..      NPO Status:  Vitals Value Taken Time   Date of last liquid 01/13/25 01/14/25 0714   Time of last liquid 2100 01/14/25 0714   Date of last solid 01/12/25 01/14/25 0714   Time of last solid 2100 01/14/25 0714

## 2025-01-14 NOTE — H&P
"History and Physical -  [facility]  Geremias Whatley 64 y.o. male MRN: [id number]                  HPI: Geremias Whatley is a 64 y.o. year old male who presents for colonoscopy for history of IBD.      REVIEW OF SYSTEMS: Per the HPI, and otherwise unremarkable.    Historical Information   Past Medical History:   Diagnosis Date    Hemorrhoids     Hyperlipidemia     Ulcerative colitis (HCC)      Past Surgical History:   Procedure Laterality Date    COLONOSCOPY      NASAL SEPTUM SURGERY  1994    PALATE SURGERY  1994    tighten    UPPER GASTROINTESTINAL ENDOSCOPY      UVULECTOMY  1994     Social History   Social History     Substance and Sexual Activity   Alcohol Use Yes    Comment: socially     Social History     Substance and Sexual Activity   Drug Use Never     Social History     Tobacco Use   Smoking Status Never   Smokeless Tobacco Never     Family History   Problem Relation Age of Onset    No Known Problems Mother     Diabetes Father     Heart attack Father     Diabetes Other     Diabetes Family     Heart disease Family        Meds/Allergies       Current Outpatient Medications:     mesalamine (LIALDA) 1.2 g EC tablet    sildenafil (VIAGRA) 50 MG tablet    terbinafine (LamISIL) 1 % cream    Allergies   Allergen Reactions    Penicillins Rash       Objective     /68   Pulse 77   Temp (!) 97.1 °F (36.2 °C) (Temporal)   Resp 19   Ht 5' 6\" (1.676 m)   Wt 78 kg (172 lb)   SpO2 98%   BMI 27.76 kg/m²       PHYSICAL EXAM    Gen: NAD  Head: NCAT  CV: RRR  CHEST: Clear  ABD: soft, NT/ND  EXT: no edema      ASSESSMENT/PLAN:  This is a 64 y.o. year old male here for colonoscopy, and he is stable and optimized for his procedure.       "

## 2025-01-14 NOTE — ANESTHESIA POSTPROCEDURE EVALUATION
Post-Op Assessment Note    CV Status:  Stable    Pain management: adequate       Mental Status:  Alert and awake   Hydration Status:  Euvolemic   PONV Controlled:  Controlled   Airway Patency:  Patent     Post Op Vitals Reviewed: Yes    No anethesia notable event occurred.    Staff: Anesthesiologist, CRNA           Last Filed PACU Vitals:  Vitals Value Taken Time   Temp 98    Pulse 76    BP 92/55    Resp 16    SpO2 100

## 2025-01-15 PROCEDURE — 88305 TISSUE EXAM BY PATHOLOGIST: CPT | Performed by: PATHOLOGY

## 2025-01-27 ENCOUNTER — OFFICE VISIT (OUTPATIENT)
Age: 65
End: 2025-01-27
Payer: COMMERCIAL

## 2025-01-27 VITALS
WEIGHT: 183.6 LBS | OXYGEN SATURATION: 98 % | DIASTOLIC BLOOD PRESSURE: 68 MMHG | SYSTOLIC BLOOD PRESSURE: 118 MMHG | HEIGHT: 66 IN | TEMPERATURE: 97.5 F | BODY MASS INDEX: 29.51 KG/M2 | HEART RATE: 74 BPM

## 2025-01-27 DIAGNOSIS — R39.9 UTI SYMPTOMS: ICD-10-CM

## 2025-01-27 DIAGNOSIS — Z13.0 SCREENING FOR DEFICIENCY ANEMIA: ICD-10-CM

## 2025-01-27 DIAGNOSIS — E78.5 HYPERLIPIDEMIA, UNSPECIFIED HYPERLIPIDEMIA TYPE: ICD-10-CM

## 2025-01-27 DIAGNOSIS — Z00.00 HEALTHCARE MAINTENANCE: Primary | ICD-10-CM

## 2025-01-27 DIAGNOSIS — Z12.5 PROSTATE CANCER SCREENING: ICD-10-CM

## 2025-01-27 DIAGNOSIS — B07.0 PLANTAR WART OF LEFT FOOT: ICD-10-CM

## 2025-01-27 DIAGNOSIS — R73.02 GLUCOSE INTOLERANCE (IMPAIRED GLUCOSE TOLERANCE): ICD-10-CM

## 2025-01-27 DIAGNOSIS — E73.9 LACTOSE INTOLERANCE: ICD-10-CM

## 2025-01-27 DIAGNOSIS — K51.00 ULCERATIVE PANCOLITIS WITHOUT COMPLICATION (HCC): ICD-10-CM

## 2025-01-27 DIAGNOSIS — K51.00 ULCERATIVE (CHRONIC) PANCOLITIS WITHOUT COMPLICATIONS (HCC): ICD-10-CM

## 2025-01-27 DIAGNOSIS — B35.1 ONYCHOMYCOSIS: ICD-10-CM

## 2025-01-27 PROCEDURE — 99396 PREV VISIT EST AGE 40-64: CPT | Performed by: INTERNAL MEDICINE

## 2025-01-27 RX ORDER — PRENATAL VIT 91/IRON/FOLIC/DHA 28-975-200
COMBINATION PACKAGE (EA) ORAL 2 TIMES DAILY
Qty: 28 G | Refills: 2 | Status: SHIPPED | OUTPATIENT
Start: 2025-01-27

## 2025-01-27 NOTE — ASSESSMENT & PLAN NOTE
On today's physical examination of the patient we find to be in good general health.  Recommend that he work on weight reduction and maintain regular exercise habits.  Healthy balanced diet avoiding concentrated fats and carbohydrates and concentrated sugars are recommended.  Blood work has been requested and will be reviewed with the patient upon completion.  He is up-to-date on his colon cancer screening and will have a PSA on his blood work

## 2025-01-27 NOTE — ASSESSMENT & PLAN NOTE
Patient has a relative lactose intolerance he understands foods that are likely to be a factor and he is cautious with his consumption of lactose

## 2025-01-27 NOTE — ASSESSMENT & PLAN NOTE
History of ulcerative colitis currently on mesalamine medication.  Recent colonoscopy results reviewed recommend continued treatment as per GI services.

## 2025-01-27 NOTE — PROGRESS NOTES
Name: Geremias Whatley      : 1960      MRN: [id number]  Encounter Provider: Charanjit Thompson MD  Encounter Date: 2025   Encounter department: [facility]IC[facility]    Assessment & Plan  Prostate cancer screening    Orders:    PSA, Total Screen; Future    Screening for deficiency anemia    Orders:    CBC and differential; Future    UTI symptoms    Orders:    UA w Reflex to Microscopic w Reflex to Culture; Future    Glucose intolerance (impaired glucose tolerance)  With a history of mild glucose elevation and updated comprehensive metabolic profile has been requested and will be reviewed with the patient upon completion.    Orders:    Comprehensive metabolic panel; Future    Hyperlipidemia, unspecified hyperlipidemia type  With a prior history of mild lipid elevation and updated lipid profile has been requested and will be reviewed with the patient upon completion.    Orders:    Lipid panel; Future    Ulcerative (chronic) pancolitis without complications (HCC)         Onychomycosis    Orders:    terbinafine (LamISIL) 1 % cream; Apply topically 2 (two) times a day    Plantar wart of left foot    Orders:    Ambulatory Referral to Podiatry; Future    Ulcerative pancolitis without complication (HCC)  History of ulcerative colitis currently on mesalamine medication.  Recent colonoscopy results reviewed recommend continued treatment as per GI services.         Lactose intolerance  Patient has a relative lactose intolerance he understands foods that are likely to be a factor and he is cautious with his consumption of lactose         Healthcare maintenance  On today's physical examination of the patient we find to be in good general health.  Recommend that he work on weight reduction and maintain regular exercise habits.  Healthy balanced diet avoiding concentrated fats and carbohydrates and concentrated sugars are recommended.  Blood work has been requested and will be reviewed with the  patient upon completion.  He is up-to-date on his colon cancer screening and will have a PSA on his blood work              History of Present Illness     This pleasant 64-year-old gentleman presents today for an annual physical examination and review of systems.  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  Denies any recent infection symptoms has had no cardiac symptoms of chest pain palpitations or shortness of breath.      Review of Systems   All other systems reviewed and are negative.    Past Medical History:   Diagnosis Date    Hemorrhoids     Hyperlipidemia     Inflammatory bowel disease     Ulcerative colitis (HCC)      Past Surgical History:   Procedure Laterality Date    COLONOSCOPY      NASAL SEPTUM SURGERY  1994    PALATE SURGERY  1994    tighten    UPPER GASTROINTESTINAL ENDOSCOPY      UVULECTOMY  1994     Family History   Problem Relation Age of Onset    No Known Problems Mother     Diabetes Father     Heart attack Father     Diabetes Other     Diabetes Family     Heart disease Family      Social History     Tobacco Use    Smoking status: Never    Smokeless tobacco: Never   Vaping Use    Vaping status: Never Used   Substance and Sexual Activity    Alcohol use: Not Currently     Comment: socially    Drug use: Never    Sexual activity: Yes     Partners: Male     Current Outpatient Medications on File Prior to Visit   Medication Sig    mesalamine (LIALDA) 1.2 g EC tablet take 4 tablets by mouth once daily with breakfast    sildenafil (VIAGRA) 50 MG tablet Take 1 tablet (50 mg total) by mouth daily as needed for erectile dysfunction    [DISCONTINUED] terbinafine (LamISIL) 1 % cream Apply topically 2 (two) times a day     Allergies   Allergen Reactions    Penicillins Rash     Immunization History   Administered Date(s) Administered    COVID-19 PFIZER VACCINE 0.3 ML IM 04/24/2021, 04/24/2021, 05/15/2021, 05/15/2021    INFLUENZA 02/11/2018, 02/11/2018,  "11/02/2020, 11/02/2020, 01/10/2022    Influenza, injectable, quadrivalent, preservative free 0.5 mL 01/22/2024    Influenza, recombinant, quadrivalent,injectable, preservative free 01/10/2022    Pneumococcal Polysaccharide PPV23 11/02/2020, 11/02/2020    Tdap 02/11/2018, 02/11/2018    influenza, injectable, quadrivalent 11/02/2020     Objective   /68   Pulse 74   Temp 97.5 °F (36.4 °C) (Tympanic)   Ht 5' 6\" (1.676 m)   Wt 83.3 kg (183 lb 9.6 oz)   SpO2 98%   BMI 29.63 kg/m²     Physical Exam  Vitals and nursing note reviewed.   Constitutional:       General: He is not in acute distress.     Appearance: He is well-developed.   HENT:      Head: Normocephalic and atraumatic.      Right Ear: Tympanic membrane, ear canal and external ear normal.      Left Ear: Tympanic membrane, ear canal and external ear normal.      Nose: Nose normal.      Mouth/Throat:      Mouth: Mucous membranes are moist.   Eyes:      Extraocular Movements: Extraocular movements intact.      Conjunctiva/sclera: Conjunctivae normal.      Pupils: Pupils are equal, round, and reactive to light.   Neck:      Vascular: No carotid bruit.   Cardiovascular:      Rate and Rhythm: Normal rate and regular rhythm.      Heart sounds: Normal heart sounds. No murmur heard.  Pulmonary:      Effort: Pulmonary effort is normal. No respiratory distress.      Breath sounds: Normal breath sounds. No wheezing, rhonchi or rales.   Abdominal:      General: Abdomen is flat. Bowel sounds are normal. There is no distension.      Palpations: Abdomen is soft. There is no mass.      Tenderness: There is no abdominal tenderness. There is no guarding.      Hernia: There is no hernia in the left inguinal area or right inguinal area.   Genitourinary:     Penis: Normal.       Testes: Normal.      Epididymis:      Right: Normal.      Left: Normal.   Musculoskeletal:         General: No swelling.      Cervical back: Normal range of motion and neck supple. No rigidity or " tenderness.   Lymphadenopathy:      Cervical: No cervical adenopathy.      Lower Body: No right inguinal adenopathy. No left inguinal adenopathy.   Skin:     General: Skin is warm and dry.      Capillary Refill: Capillary refill takes less than 2 seconds.      Coloration: Skin is not jaundiced or pale.   Neurological:      General: No focal deficit present.      Mental Status: He is alert. Mental status is at baseline.   Psychiatric:         Mood and Affect: Mood normal.         Behavior: Behavior normal.         Thought Content: Thought content normal.         Judgment: Judgment normal.          5

## 2025-01-27 NOTE — ASSESSMENT & PLAN NOTE
With a history of mild glucose elevation and updated comprehensive metabolic profile has been requested and will be reviewed with the patient upon completion.    Orders:    Comprehensive metabolic panel; Future

## 2025-01-27 NOTE — ASSESSMENT & PLAN NOTE
With a prior history of mild lipid elevation and updated lipid profile has been requested and will be reviewed with the patient upon completion.    Orders:    Lipid panel; Future

## 2025-01-28 ENCOUNTER — APPOINTMENT (OUTPATIENT)
Dept: LAB | Facility: CLINIC | Age: 65
End: 2025-01-28
Payer: COMMERCIAL

## 2025-01-28 DIAGNOSIS — Z13.0 SCREENING FOR DEFICIENCY ANEMIA: ICD-10-CM

## 2025-01-28 DIAGNOSIS — Z12.5 PROSTATE CANCER SCREENING: ICD-10-CM

## 2025-01-28 DIAGNOSIS — E78.5 HYPERLIPIDEMIA, UNSPECIFIED HYPERLIPIDEMIA TYPE: ICD-10-CM

## 2025-01-28 DIAGNOSIS — R39.9 UTI SYMPTOMS: ICD-10-CM

## 2025-01-28 DIAGNOSIS — R73.02 GLUCOSE INTOLERANCE (IMPAIRED GLUCOSE TOLERANCE): ICD-10-CM

## 2025-01-28 LAB
ALBUMIN SERPL BCG-MCNC: 4.2 G/DL (ref 3.5–5)
ALP SERPL-CCNC: 79 U/L (ref 34–104)
ALT SERPL W P-5'-P-CCNC: 26 U/L (ref 7–52)
ANION GAP SERPL CALCULATED.3IONS-SCNC: 12 MMOL/L (ref 4–13)
AST SERPL W P-5'-P-CCNC: 26 U/L (ref 13–39)
BASOPHILS # BLD AUTO: 0.04 THOUSANDS/ΜL (ref 0–0.1)
BASOPHILS NFR BLD AUTO: 1 % (ref 0–1)
BILIRUB SERPL-MCNC: 0.61 MG/DL (ref 0.2–1)
BILIRUB UR QL STRIP: NEGATIVE
BUN SERPL-MCNC: 18 MG/DL (ref 5–25)
CALCIUM SERPL-MCNC: 9.6 MG/DL (ref 8.4–10.2)
CHLORIDE SERPL-SCNC: 101 MMOL/L (ref 96–108)
CHOLEST SERPL-MCNC: 199 MG/DL (ref ?–200)
CLARITY UR: CLEAR
CO2 SERPL-SCNC: 26 MMOL/L (ref 21–32)
COLOR UR: NORMAL
CREAT SERPL-MCNC: 1.01 MG/DL (ref 0.6–1.3)
EOSINOPHIL # BLD AUTO: 0.14 THOUSAND/ΜL (ref 0–0.61)
EOSINOPHIL NFR BLD AUTO: 3 % (ref 0–6)
ERYTHROCYTE [DISTWIDTH] IN BLOOD BY AUTOMATED COUNT: 12 % (ref 11.6–15.1)
GFR SERPL CREATININE-BSD FRML MDRD: 78 ML/MIN/1.73SQ M
GLUCOSE P FAST SERPL-MCNC: 91 MG/DL (ref 65–99)
GLUCOSE UR STRIP-MCNC: NEGATIVE MG/DL
HCT VFR BLD AUTO: 47.5 % (ref 36.5–49.3)
HDLC SERPL-MCNC: 34 MG/DL
HGB BLD-MCNC: 15.2 G/DL (ref 12–17)
HGB UR QL STRIP.AUTO: NEGATIVE
IMM GRANULOCYTES # BLD AUTO: 0.02 THOUSAND/UL (ref 0–0.2)
IMM GRANULOCYTES NFR BLD AUTO: 0 % (ref 0–2)
KETONES UR STRIP-MCNC: NEGATIVE MG/DL
LDLC SERPL CALC-MCNC: 139 MG/DL (ref 0–100)
LEUKOCYTE ESTERASE UR QL STRIP: NEGATIVE
LYMPHOCYTES # BLD AUTO: 1.27 THOUSANDS/ΜL (ref 0.6–4.47)
LYMPHOCYTES NFR BLD AUTO: 23 % (ref 14–44)
MCH RBC QN AUTO: 29.1 PG (ref 26.8–34.3)
MCHC RBC AUTO-ENTMCNC: 32 G/DL (ref 31.4–37.4)
MCV RBC AUTO: 91 FL (ref 82–98)
MONOCYTES # BLD AUTO: 0.44 THOUSAND/ΜL (ref 0.17–1.22)
MONOCYTES NFR BLD AUTO: 8 % (ref 4–12)
NEUTROPHILS # BLD AUTO: 3.66 THOUSANDS/ΜL (ref 1.85–7.62)
NEUTS SEG NFR BLD AUTO: 65 % (ref 43–75)
NITRITE UR QL STRIP: NEGATIVE
NONHDLC SERPL-MCNC: 165 MG/DL
NRBC BLD AUTO-RTO: 0 /100 WBCS
PH UR STRIP.AUTO: 6 [PH]
PLATELET # BLD AUTO: 389 THOUSANDS/UL (ref 149–390)
PMV BLD AUTO: 9.1 FL (ref 8.9–12.7)
POTASSIUM SERPL-SCNC: 4.6 MMOL/L (ref 3.5–5.3)
PROT SERPL-MCNC: 7.7 G/DL (ref 6.4–8.4)
PROT UR STRIP-MCNC: NEGATIVE MG/DL
PSA SERPL-MCNC: 0.9 NG/ML (ref 0–4)
RBC # BLD AUTO: 5.23 MILLION/UL (ref 3.88–5.62)
SODIUM SERPL-SCNC: 139 MMOL/L (ref 135–147)
SP GR UR STRIP.AUTO: 1.02 (ref 1–1.03)
TRIGL SERPL-MCNC: 130 MG/DL (ref ?–150)
UROBILINOGEN UR STRIP-ACNC: <2 MG/DL
WBC # BLD AUTO: 5.57 THOUSAND/UL (ref 4.31–10.16)

## 2025-01-28 PROCEDURE — 81003 URINALYSIS AUTO W/O SCOPE: CPT

## 2025-01-28 PROCEDURE — 36415 COLL VENOUS BLD VENIPUNCTURE: CPT

## 2025-01-28 PROCEDURE — 80061 LIPID PANEL: CPT

## 2025-01-28 PROCEDURE — G0103 PSA SCREENING: HCPCS

## 2025-01-28 PROCEDURE — 80053 COMPREHEN METABOLIC PANEL: CPT

## 2025-01-28 PROCEDURE — 85025 COMPLETE CBC W/AUTO DIFF WBC: CPT

## 2025-01-31 ENCOUNTER — RESULTS FOLLOW-UP (OUTPATIENT)
Dept: GASTROENTEROLOGY | Facility: CLINIC | Age: 65
End: 2025-01-31

## 2025-02-18 ENCOUNTER — OFFICE VISIT (OUTPATIENT)
Dept: PODIATRY | Facility: CLINIC | Age: 65
End: 2025-02-18
Payer: COMMERCIAL

## 2025-02-18 ENCOUNTER — TELEPHONE (OUTPATIENT)
Age: 65
End: 2025-02-18

## 2025-02-18 VITALS — BODY MASS INDEX: 28.93 KG/M2 | WEIGHT: 180 LBS | HEIGHT: 66 IN

## 2025-02-18 DIAGNOSIS — H66.90 EAR INFECTION: Primary | ICD-10-CM

## 2025-02-18 DIAGNOSIS — N52.9 ERECTILE DYSFUNCTION, UNSPECIFIED ERECTILE DYSFUNCTION TYPE: ICD-10-CM

## 2025-02-18 DIAGNOSIS — M79.672 LEFT FOOT PAIN: ICD-10-CM

## 2025-02-18 DIAGNOSIS — L85.2 PUNCTATE KERATOSIS: Primary | ICD-10-CM

## 2025-02-18 PROCEDURE — 99202 OFFICE O/P NEW SF 15 MIN: CPT | Performed by: PODIATRIST

## 2025-02-18 RX ORDER — UREA 40 %
CREAM (GRAM) TOPICAL DAILY
Qty: 227 G | Refills: 1 | Status: SHIPPED | OUTPATIENT
Start: 2025-02-18

## 2025-02-18 RX ORDER — SILDENAFIL 50 MG/1
50 TABLET, FILM COATED ORAL DAILY PRN
Qty: 6 TABLET | Refills: 2 | Status: SHIPPED | OUTPATIENT
Start: 2025-02-18

## 2025-02-18 RX ORDER — AZITHROMYCIN 250 MG/1
TABLET, FILM COATED ORAL
Qty: 6 TABLET | Refills: 0 | Status: SHIPPED | OUTPATIENT
Start: 2025-02-18 | End: 2025-02-23

## 2025-02-18 NOTE — TELEPHONE ENCOUNTER
Prescription for refills on the patient's Viagra has been sent to his pharmacy also a prescription for Zithromax has been sent for his earache thank you

## 2025-02-18 NOTE — LETTER
February 18, 2025     Charanjit Thompson MD  [address]  [address]  [place] [address]    Patient: Geremias Whatley   YOB: 1960   Date of Visit: 2/18/2025       Dear Dr. Thompson:    Thank you for referring Geremias Whatley to me for evaluation. Below are my notes for this consultation.    If you have questions, please do not hesitate to call me. I look forward to following your patient along with you.         Sincerely,        Soren Banegas DPM        CC: No Recipients    Soren Banegas DPM  2/18/2025 11:05 AM  Sign when Signing Visit                 PATIENT:  Geremias Whatley  1960       ASSESSMENT:     1. Punctate keratosis  urea (CARMOL) 40 %      2. Left foot pain  Ambulatory Referral to Podiatry                PLAN:  1. Reviewed medical records.  Reviewed the note from PCP.  Patient was counseled and educated on the condition and the diagnosis.    2. The exam and symptoms are more consistent with punctate keratosis rather than wart.  The diagnosis, treatment options and prognosis were discussed with the patient.    3. Lesions were removed and instructed proper skin care and protection since his condition is related to dry skin and pressure.  4. Rx: 40% Urea cream.  5. Pt to return if his condition does not resolve.      Imaging: I have personally reviewed pertinent films in PACS  Labs, pathology, and Other Studies: I have personally reviewed pertinent reports.        Subjective:       HPI  The patient was referred to my office for painful skin lesion.  His PCP suggested possible wart.  He has sharp pain in left plantar foot when he walks.  He had it for a long time and it had been worse.  He also had similar problem in right foot.  He notices hard skin.  No bleeding, or redness.  Denied injury.  No history of diabetes.       The following portions of the patient's history were reviewed and updated as appropriate: allergies, current medications, past family history, past medical history, past  social history, past surgical history and problem list.  All pertinent labs and images were reviewed.      Past Medical History  Past Medical History:   Diagnosis Date   • Hemorrhoids    • Hyperlipidemia    • Inflammatory bowel disease    • Ulcerative colitis (HCC)        Past Surgical History  Past Surgical History:   Procedure Laterality Date   • COLONOSCOPY     • NASAL SEPTUM SURGERY  1994   • PALATE SURGERY  1994    tighten   • UPPER GASTROINTESTINAL ENDOSCOPY     • UVULECTOMY  1994        Allergies:  Penicillins    Medications:  Current Outpatient Medications   Medication Sig Dispense Refill   • mesalamine (LIALDA) 1.2 g EC tablet take 4 tablets by mouth once daily with breakfast 120 tablet 5   • sildenafil (VIAGRA) 50 MG tablet Take 1 tablet (50 mg total) by mouth daily as needed for erectile dysfunction 6 tablet 2   • terbinafine (LamISIL) 1 % cream Apply topically 2 (two) times a day 28 g 2   • urea (CARMOL) 40 % Apply topically daily 227 g 1     No current facility-administered medications for this visit.       Social History:  Social History     Socioeconomic History   • Marital status: /Civil Union     Spouse name: Not on file   • Number of children: Not on file   • Years of education: Not on file   • Highest education level: Not on file   Occupational History   • Not on file   Tobacco Use   • Smoking status: Never   • Smokeless tobacco: Never   Vaping Use   • Vaping status: Never Used   Substance and Sexual Activity   • Alcohol use: Not Currently     Comment: socially   • Drug use: Never   • Sexual activity: Yes     Partners: Male   Other Topics Concern   • Not on file   Social History Narrative   • Not on file     Social Drivers of Health     Financial Resource Strain: Not on file   Food Insecurity: Not on file   Transportation Needs: Not on file   Physical Activity: Not on file   Stress: Not on file   Social Connections: Not on file   Intimate Partner Violence: Not on file   Housing Stability:  "Not on file          Review of Systems   Constitutional:  Negative for chills and fever.   Respiratory:  Negative for cough and shortness of breath.    Cardiovascular:  Negative for chest pain.   Gastrointestinal:  Negative for nausea and vomiting.   Musculoskeletal:  Negative for gait problem.   Neurological:  Negative for weakness and numbness.         Objective:      Ht 5' 6\" (1.676 m)   Wt 81.6 kg (180 lb)   BMI 29.05 kg/m²          Physical Exam  Vitals reviewed.   Constitutional:       General: He is not in acute distress.     Appearance: He is not toxic-appearing or diaphoretic.   HENT:      Head: Normocephalic and atraumatic.   Eyes:      Extraocular Movements: Extraocular movements intact.   Cardiovascular:      Rate and Rhythm: Normal rate and regular rhythm.      Pulses: Normal pulses.           Dorsalis pedis pulses are 2+ on the right side and 2+ on the left side.        Posterior tibial pulses are 2+ on the right side and 2+ on the left side.   Pulmonary:      Effort: Pulmonary effort is normal. No respiratory distress.   Musculoskeletal:         General: No signs of injury.      Cervical back: Normal range of motion and neck supple.      Right lower leg: No edema.      Left lower leg: No edema.      Right foot: No foot drop.      Left foot: No foot drop.   Skin:     General: Skin is warm and dry.      Capillary Refill: Capillary refill takes less than 2 seconds.      Coloration: Skin is not cyanotic or mottled.      Findings: No abscess or wound.      Nails: There is no clubbing.      Comments: Pin point keratosis with surrounding callus in left submet 5 and lateral right 5th met head.   Neurological:      General: No focal deficit present.      Mental Status: He is alert and oriented to person, place, and time.      Cranial Nerves: No cranial nerve deficit.      Sensory: No sensory deficit.      Motor: No weakness.      Coordination: Coordination normal.   Psychiatric:         Mood and Affect: Mood " normal.         Behavior: Behavior normal.         Thought Content: Thought content normal.         Judgment: Judgment normal.

## 2025-02-18 NOTE — PROGRESS NOTES
PATIENT:  Geremias Whatley  1960       ASSESSMENT:     1. Punctate keratosis  urea (CARMOL) 40 %      2. Left foot pain  Ambulatory Referral to Podiatry                PLAN:  1. Reviewed medical records.  Reviewed the note from PCP.  Patient was counseled and educated on the condition and the diagnosis.    2. The exam and symptoms are more consistent with punctate keratosis rather than wart.  The diagnosis, treatment options and prognosis were discussed with the patient.    3. Lesions were removed and instructed proper skin care and protection since his condition is related to dry skin and pressure.  4. Rx: 40% Urea cream.  5. Pt to return if his condition does not resolve.      Imaging: I have personally reviewed pertinent films in PACS  Labs, pathology, and Other Studies: I have personally reviewed pertinent reports.        Subjective:       HPI  The patient was referred to my office for painful skin lesion.  His PCP suggested possible wart.  He has sharp pain in left plantar foot when he walks.  He had it for a long time and it had been worse.  He also had similar problem in right foot.  He notices hard skin.  No bleeding, or redness.  Denied injury.  No history of diabetes.       The following portions of the patient's history were reviewed and updated as appropriate: allergies, current medications, past family history, past medical history, past social history, past surgical history and problem list.  All pertinent labs and images were reviewed.      Past Medical History  Past Medical History:   Diagnosis Date    Hemorrhoids     Hyperlipidemia     Inflammatory bowel disease     Ulcerative colitis (HCC)        Past Surgical History  Past Surgical History:   Procedure Laterality Date    COLONOSCOPY      NASAL SEPTUM SURGERY  1994    PALATE SURGERY  1994    tighten    UPPER GASTROINTESTINAL ENDOSCOPY      UVULECTOMY  1994        Allergies:  Penicillins    Medications:  Current Outpatient  "Medications   Medication Sig Dispense Refill    mesalamine (LIALDA) 1.2 g EC tablet take 4 tablets by mouth once daily with breakfast 120 tablet 5    sildenafil (VIAGRA) 50 MG tablet Take 1 tablet (50 mg total) by mouth daily as needed for erectile dysfunction 6 tablet 2    terbinafine (LamISIL) 1 % cream Apply topically 2 (two) times a day 28 g 2    urea (CARMOL) 40 % Apply topically daily 227 g 1     No current facility-administered medications for this visit.       Social History:  Social History     Socioeconomic History    Marital status: /Civil Union     Spouse name: Not on file    Number of children: Not on file    Years of education: Not on file    Highest education level: Not on file   Occupational History    Not on file   Tobacco Use    Smoking status: Never    Smokeless tobacco: Never   Vaping Use    Vaping status: Never Used   Substance and Sexual Activity    Alcohol use: Not Currently     Comment: socially    Drug use: Never    Sexual activity: Yes     Partners: Male   Other Topics Concern    Not on file   Social History Narrative    Not on file     Social Drivers of Health     Financial Resource Strain: Not on file   Food Insecurity: Not on file   Transportation Needs: Not on file   Physical Activity: Not on file   Stress: Not on file   Social Connections: Not on file   Intimate Partner Violence: Not on file   Housing Stability: Not on file          Review of Systems   Constitutional:  Negative for chills and fever.   Respiratory:  Negative for cough and shortness of breath.    Cardiovascular:  Negative for chest pain.   Gastrointestinal:  Negative for nausea and vomiting.   Musculoskeletal:  Negative for gait problem.   Neurological:  Negative for weakness and numbness.         Objective:      Ht 5' 6\" (1.676 m)   Wt 81.6 kg (180 lb)   BMI 29.05 kg/m²          Physical Exam  Vitals reviewed.   Constitutional:       General: He is not in acute distress.     Appearance: He is not " Cancer Maternal Aunt          Allergies   Allergen Reactions    Latex Other (See Comments)    Penicillins Hives and Swelling    Aspirin Itching    Codeine Nausea Only    Iodine Hives    Macadamia Nut Oil Swelling     TREE NUTS      Sulfa Antibiotics Itching         Current Outpatient Medications   Medication Sig    varenicline (CHANTIX) 1 MG tablet Take 1 tablet by mouth 2 times daily    venlafaxine (EFFEXOR) 75 MG tablet Take 1 tablet by mouth 3 times daily    predniSONE (DELTASONE) 10 MG tablet Take 1.5 tablets by mouth daily    azithromycin (ZITHROMAX) 250 MG tablet Take 1 tablet by mouth three times a week    OXYGEN 4 lpm during sleep. albuterol (PROVENTIL) (2.5 MG/3ML) 0.083% nebulizer solution Inhale into the lungs    albuterol sulfate  (90 Base) MCG/ACT inhaler Ventolin HFA 90 mcg/actuation aerosol inhaler    aspirin 81 MG EC tablet Take by mouth daily    vitamin D3 (CHOLECALCIFEROL) 125 MCG (5000 UT) TABS tablet Take by mouth daily    fluticasone-umeclidin-vilant (TRELEGY ELLIPTA) 100-62.5-25 MCG/INH AEPB Inhale 1 puff into the lungs daily    pantoprazole (PROTONIX) 40 MG tablet Take 1 tablet by mouth daily    traZODone (DESYREL) 50 MG tablet Take 1 tablet by mouth nightly (Patient not taking: Reported on 8/10/2023)    DULoxetine (CYMBALTA) 30 MG extended release capsule Take 30 mg by mouth daily (Patient not taking: Reported on 8/10/2023)    HYDROcodone-acetaminophen (NORCO) 5-325 MG per tablet Take 1 tablet by mouth every 6 hours as needed. (Patient not taking: Reported on 8/10/2023)    meloxicam (MOBIC) 7.5 MG tablet Take by mouth daily (Patient not taking: Reported on 5/3/2023)     No current facility-administered medications for this visit. REVIEW OF SYSTEMS:   CONSTITUTIONAL:   There is no history of fever, chills, night sweats, weight loss, weight gain, persistent fatigue, or lethargy/hypersomnolence.    EYES:   Denies problems with eye pain, erythema, blurred vision, or visual toxic-appearing or diaphoretic.   HENT:      Head: Normocephalic and atraumatic.   Eyes:      Extraocular Movements: Extraocular movements intact.   Cardiovascular:      Rate and Rhythm: Normal rate and regular rhythm.      Pulses: Normal pulses.           Dorsalis pedis pulses are 2+ on the right side and 2+ on the left side.        Posterior tibial pulses are 2+ on the right side and 2+ on the left side.   Pulmonary:      Effort: Pulmonary effort is normal. No respiratory distress.   Musculoskeletal:         General: No signs of injury.      Cervical back: Normal range of motion and neck supple.      Right lower leg: No edema.      Left lower leg: No edema.      Right foot: No foot drop.      Left foot: No foot drop.   Skin:     General: Skin is warm and dry.      Capillary Refill: Capillary refill takes less than 2 seconds.      Coloration: Skin is not cyanotic or mottled.      Findings: No abscess or wound.      Nails: There is no clubbing.      Comments: Pin point keratosis with surrounding callus in left submet 5 and lateral right 5th met head.   Neurological:      General: No focal deficit present.      Mental Status: He is alert and oriented to person, place, and time.      Cranial Nerves: No cranial nerve deficit.      Sensory: No sensory deficit.      Motor: No weakness.      Coordination: Coordination normal.   Psychiatric:         Mood and Affect: Mood normal.         Behavior: Behavior normal.         Thought Content: Thought content normal.         Judgment: Judgment normal.

## 2025-02-18 NOTE — TELEPHONE ENCOUNTER
Patient called stating that they need the script renewed for sildenafil (VIAGRA) 50 MG tablet. And for it to be sent to the [facility] pharmacy in [place] on file.    Patient also expressed that they are expierencing an earache of the right ear for about a week and is requesting an antibiotic for that as well to be sent to the [facility] pharmacy on file.    Please advise out to patient once these have been sent over or for any further concerns.

## 2025-02-26 PROBLEM — Z00.00 HEALTHCARE MAINTENANCE: Status: RESOLVED | Noted: 2025-01-27 | Resolved: 2025-02-26

## 2025-03-10 DIAGNOSIS — K52.9 INFLAMMATORY BOWEL DISEASE: ICD-10-CM

## 2025-03-11 RX ORDER — MESALAMINE 1.2 G/1
TABLET, DELAYED RELEASE ORAL
Qty: 120 TABLET | Refills: 5 | Status: SHIPPED | OUTPATIENT
Start: 2025-03-11

## 2025-04-10 DIAGNOSIS — K52.9 INFLAMMATORY BOWEL DISEASE: ICD-10-CM

## 2025-04-10 NOTE — TELEPHONE ENCOUNTER
Patient called requesting refill for mesalamine. Patient made aware medication was refilled on 03.11.2025 for 120 with 5 refills to [facility]  pharmacy. Patient instructed to contact the pharmacy to obtain refills of medication. Patient verbalized understanding.